# Patient Record
Sex: FEMALE | Race: BLACK OR AFRICAN AMERICAN | Employment: UNEMPLOYED | URBAN - METROPOLITAN AREA
[De-identification: names, ages, dates, MRNs, and addresses within clinical notes are randomized per-mention and may not be internally consistent; named-entity substitution may affect disease eponyms.]

---

## 2018-10-13 ENCOUNTER — APPOINTMENT (OUTPATIENT)
Dept: CT IMAGING | Age: 68
DRG: 683 | End: 2018-10-13
Attending: NURSE PRACTITIONER
Payer: MEDICARE

## 2018-10-13 ENCOUNTER — APPOINTMENT (OUTPATIENT)
Dept: GENERAL RADIOLOGY | Age: 68
DRG: 683 | End: 2018-10-13
Attending: NURSE PRACTITIONER
Payer: MEDICARE

## 2018-10-13 ENCOUNTER — HOSPITAL ENCOUNTER (INPATIENT)
Age: 68
LOS: 2 days | Discharge: HOME HEALTH | DRG: 683 | End: 2018-10-16
Attending: EMERGENCY MEDICINE | Admitting: HOSPITALIST
Payer: MEDICARE

## 2018-10-13 DIAGNOSIS — E87.6 HYPOKALEMIA: ICD-10-CM

## 2018-10-13 DIAGNOSIS — R03.0 ELEVATED BLOOD PRESSURE READING: ICD-10-CM

## 2018-10-13 DIAGNOSIS — N18.9 CHRONIC RENAL FAILURE, UNSPECIFIED CKD STAGE: Primary | ICD-10-CM

## 2018-10-13 DIAGNOSIS — E87.8 HYPOCHLOREMIA: ICD-10-CM

## 2018-10-13 DIAGNOSIS — R42 DIZZINESS: ICD-10-CM

## 2018-10-13 DIAGNOSIS — E87.1 HYPONATREMIA: ICD-10-CM

## 2018-10-13 LAB
AMORPH CRY URNS QL MICRO: ABNORMAL
APPEARANCE UR: CLEAR
BACTERIA URNS QL MICRO: NEGATIVE /HPF
BASOPHILS # BLD: 0 K/UL (ref 0–0.1)
BASOPHILS NFR BLD: 0 % (ref 0–2)
BILIRUB UR QL: NEGATIVE
COLOR UR: YELLOW
DIFFERENTIAL METHOD BLD: ABNORMAL
EOSINOPHIL # BLD: 0 K/UL (ref 0–0.4)
EOSINOPHIL NFR BLD: 0 % (ref 0–5)
EPITH CASTS URNS QL MICRO: ABNORMAL /LPF (ref 0–5)
ERYTHROCYTE [DISTWIDTH] IN BLOOD BY AUTOMATED COUNT: 14.5 % (ref 11.6–14.5)
GLUCOSE UR STRIP.AUTO-MCNC: NEGATIVE MG/DL
HCT VFR BLD AUTO: 39 % (ref 35–45)
HGB BLD-MCNC: 13.2 G/DL (ref 12–16)
HGB UR QL STRIP: ABNORMAL
KETONES UR QL STRIP.AUTO: NEGATIVE MG/DL
LEUKOCYTE ESTERASE UR QL STRIP.AUTO: NEGATIVE
LYMPHOCYTES # BLD: 1.1 K/UL (ref 0.9–3.6)
LYMPHOCYTES NFR BLD: 25 % (ref 21–52)
MCH RBC QN AUTO: 27.7 PG (ref 24–34)
MCHC RBC AUTO-ENTMCNC: 33.8 G/DL (ref 31–37)
MCV RBC AUTO: 81.8 FL (ref 74–97)
MONOCYTES # BLD: 0.3 K/UL (ref 0.05–1.2)
MONOCYTES NFR BLD: 6 % (ref 3–10)
NEUTS SEG # BLD: 3.2 K/UL (ref 1.8–8)
NEUTS SEG NFR BLD: 69 % (ref 40–73)
NITRITE UR QL STRIP.AUTO: NEGATIVE
PH UR STRIP: 5 [PH] (ref 5–8)
PLATELET # BLD AUTO: 124 K/UL (ref 135–420)
PMV BLD AUTO: 11.2 FL (ref 9.2–11.8)
PROT UR STRIP-MCNC: NEGATIVE MG/DL
RBC # BLD AUTO: 4.77 M/UL (ref 4.2–5.3)
RBC #/AREA URNS HPF: ABNORMAL /HPF (ref 0–5)
SP GR UR REFRACTOMETRY: 1.01 (ref 1–1.03)
UROBILINOGEN UR QL STRIP.AUTO: 0.2 EU/DL (ref 0.2–1)
WBC # BLD AUTO: 4.6 K/UL (ref 4.6–13.2)
WBC URNS QL MICRO: ABNORMAL /HPF (ref 0–4)

## 2018-10-13 PROCEDURE — 81001 URINALYSIS AUTO W/SCOPE: CPT | Performed by: NURSE PRACTITIONER

## 2018-10-13 PROCEDURE — 82550 ASSAY OF CK (CPK): CPT | Performed by: EMERGENCY MEDICINE

## 2018-10-13 PROCEDURE — 80076 HEPATIC FUNCTION PANEL: CPT | Performed by: EMERGENCY MEDICINE

## 2018-10-13 PROCEDURE — 99285 EMERGENCY DEPT VISIT HI MDM: CPT

## 2018-10-13 PROCEDURE — 70450 CT HEAD/BRAIN W/O DYE: CPT

## 2018-10-13 PROCEDURE — 80048 BASIC METABOLIC PNL TOTAL CA: CPT | Performed by: EMERGENCY MEDICINE

## 2018-10-13 PROCEDURE — 85025 COMPLETE CBC W/AUTO DIFF WBC: CPT | Performed by: NURSE PRACTITIONER

## 2018-10-13 PROCEDURE — 71045 X-RAY EXAM CHEST 1 VIEW: CPT

## 2018-10-13 PROCEDURE — 93005 ELECTROCARDIOGRAM TRACING: CPT

## 2018-10-13 RX ORDER — MECLIZINE HCL 12.5 MG 12.5 MG/1
25 TABLET ORAL
Status: COMPLETED | OUTPATIENT
Start: 2018-10-13 | End: 2018-10-14

## 2018-10-13 NOTE — IP AVS SNAPSHOT
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 
[phone] Discharge Orders None A check maxime indicates which time of day the medication should be taken. My Medications CONTINUE taking these medications Instructions Each Dose to Equal  
 Morning Noon Evening Bedtime AGGRENOX  mg per SR capsule Generic drug:  aspirin-dipyridamole Your last dose was: Your next dose is: Take 1 Cap by mouth two (2) times a day. 1 Cap GLUCOTROL 10 mg tablet Generic drug:  glipiZIDE Your last dose was: Your next dose is: Take 10 mg by mouth daily. 10 mg  
    
   
   
   
  
 ibuprofen 800 mg tablet Commonly known as:  MOTRIN Your last dose was: Your next dose is: Take 1 Tab by mouth every eight (8) hours as needed for Pain (take with food). 800 mg  
    
   
   
   
  
 oxyCODONE-acetaminophen 5-325 mg per tablet Commonly known as:  PERCOCET Your last dose was: Your next dose is: Take 1 tablet every 4-6 hours as needed for pain control. If you were instructed to try over the counter ibuprofen or tylenol, only take the percocet for pain not controlled with the over the counter medication. simvastatin 20 mg tablet Commonly known as:  ZOCOR Your last dose was: Your next dose is: Take 20 mg by mouth nightly. 20 mg  
    
   
   
   
  
  
STOP taking these medications DIOVAN 80 mg tablet Generic drug:  valsartan Opioid Education Prescription Opioids: What You Need to Know: 
 
 
After general anesthesia or intravenous sedation, for 24 hours or while taking prescription Narcotics: · Limit your activities · Do not drive and operate hazardous machinery · Do not make important personal or business decisions · Do  not drink alcoholic beverages · If you have not urinated within 8 hours after discharge, please contact your surgeon on call. Report the following to your surgeon: 
· Excessive pain, swelling, redness or odor of or around the surgical area · Temperature over 100.5 · Nausea and vomiting lasting longer than 4 hours or if unable to take medications · Any signs of decreased circulation or nerve impairment to extremity: change in color, persistent  numbness, tingling, coldness or increase pain · Any questions What to do at Home: 
Recommended activity: Activity as tolerated and no driving for today. If you experience any of the symptoms above, please follow up with your primary care physician. *  Please give a list of your current medications to your Primary Care Provider.  
 
*  Please update this list whenever your medications are discontinued, doses are 
 changed, or new medications (including over-the-counter products) are added. *  Please carry medication information at all times in case of emergency situations. These are general instructions for a healthy lifestyle: No smoking/ No tobacco products/ Avoid exposure to second hand smoke Surgeon General's Warning:  Quitting smoking now greatly reduces serious risk to your health. Obesity, smoking, and sedentary lifestyle greatly increases your risk for illness A healthy diet, regular physical exercise & weight monitoring are important for maintaining a healthy lifestyle You may be retaining fluid if you have a history of heart failure or if you experience any of the following symptoms:  Weight gain of 3 pounds or more overnight or 5 pounds in a week, increased swelling in our hands or feet or shortness of breath while lying flat in bed. Please call your doctor as soon as you notice any of these symptoms; do not wait until your next office visit. Recognize signs and symptoms of STROKE: 
 
F-face looks uneven A-arms unable to move or move unevenly S-speech slurred or non-existent T-time-call 911 as soon as signs and symptoms begin-DO NOT go Back to bed or wait to see if you get better-TIME IS BRAIN. Warning Signs of HEART ATTACK Call 911 if you have these symptoms: 
? Chest discomfort. Most heart attacks involve discomfort in the center of the chest that lasts more than a few minutes, or that goes away and comes back. It can feel like uncomfortable pressure, squeezing, fullness, or pain. ? Discomfort in other areas of the upper body. Symptoms can include pain or discomfort in one or both arms, the back, neck, jaw, or stomach. ? Shortness of breath with or without chest discomfort. ? Other signs may include breaking out in a cold sweat, nausea, or lightheadedness. Don't wait more than five minutes to call [address]!  Fast action can save your life. Calling 911 is almost always the fastest way to get lifesaving treatment. Emergency Medical Services staff can begin treatment when they arrive  up to an hour sooner than if someone gets to the hospital by car. The discharge information has been reviewed with the patient. The patient verbalized understanding. Discharge medications reviewed with the patient and appropriate educational materials and side effects teaching were provided. Patient armband removed and shredded. ___________________________________________________________________________________________________________________________________MyChart Activation Thank you for requesting access to Tripeese. Please follow the instructions below to securely access and download your online medical record. Tripeese allows you to send messages to your doctor, view your test results, renew your prescriptions, schedule appointments, and more. How Do I Sign Up? 1. In your internet browser, go to [email or web address] 
2. Click on the First Time User? Click Here link in the Sign In box. You will be redirect to the New Member Sign Up page. 3. Enter your Tripeese Access Code exactly as it appears below. You will not need to use this code after youve completed the sign-up process. If you do not sign up before the expiration date, you must request a new code. Tripeese Access Code: [email or web address] Expires: 2019  8:57 PM (This is the date your Tripeese access code will ) 4. Enter the last four digits of your Social Security Number (xxxx) and Date of Birth (mm/dd/yyyy) as indicated and click Submit. You will be taken to the next sign-up page. 5. Create a Tripeese ID. This will be your Tripeese login ID and cannot be changed, so think of one that is secure and easy to remember. 6. Create a Tripeese password. You can change your password at any time. 7. Enter your Password Reset Question and Answer. This can be used at a later time if you forget your password. 8. Enter your e-mail address. You will receive e-mail notification when new information is available in [address] 9. Click Sign Up. You can now view and download portions of your medical record. 10. Click the Download Summary menu link to download a portable copy of your medical information. Additional Information If you have questions, please visit the Frequently Asked Questions section of the Wikirin website at [email or web address] Remember, [employer]irin is NOT to be used for urgent needs. For medical emergencies, dial 911. Wikirin Announcement We are excited to announce that we are making your provider's discharge notes available to you in Wikirin. You will see these notes when they are completed and signed by the physician that discharged you from your recent hospital stay. If you have any questions or concerns about any information you see in Wikirin, please call the Health Information Department where you were seen or reach out to your Primary Care Provider for more information about your plan of care. Introducing [facility]! [employer] introduces Wikirin patient portal. Now you can access parts of your medical record, email your doctor's office, and request medication refills online. 1. In your internet browser, go to [email or web address] 2. Click on the First Time User? Click Here link in the Sign In box. You will see the New Member Sign Up page. 3. Enter your Wikirin Access Code exactly as it appears below. You will not need to use this code after youve completed the sign-up process. If you do not sign up before the expiration date, you must request a new code. · Wikirin Access Code: [email or web address] Expires: 1/11/2019  8:57 PM 
 
4.  Enter the last four digits of your Social Security Number (xxxx) and Date of Birth (mm/dd/yyyy) as indicated and click Submit. You will be taken to the next sign-up page. 5. Create a YieldBuildt ID. This will be your AIRSIS login ID and cannot be changed, so think of one that is secure and easy to remember. 6. Create a YieldBuildt password. You can change your password at any time. 7. Enter your Password Reset Question and Answer. This can be used at a later time if you forget your password. 8. Enter your e-mail address. You will receive e-mail notification when new information is available in [address] 9. Click Sign Up. You can now view and download portions of your medical record. 10. Click the Download Summary menu link to download a portable copy of your medical information. If you have questions, please visit the Frequently Asked Questions section of the AIRSIS website. Remember, AIRSIS is NOT to be used for urgent needs. For medical emergencies, dial 911. Now available from your iPhone and Android! Introducing Rusty Katz As a [employer] patient, I wanted to make you aware of our electronic visit tool called Rusty Garrettfin. [address] 24/7 allows you to connect within minutes with a medical provider 24 hours a day, seven days a week via a mobile device or tablet or logging into a secure website from your computer. You can access Rusty Katz from anywhere in the [place]. A virtual visit might be right for you when you have a simple condition and feel like you just dont want to get out of bed, or cant get away from work for an appointment, when your regular [employer] provider is not available (evenings, weekends or holidays), or when youre out of town and need minor care. Electronic visits cost only $49 and if the [employer] 24/7 provider determines a prescription is needed to treat your condition, one can be electronically transmitted to a nearby pharmacy*. Please take a moment to enroll today if you have not already done so. The enrollment process is free and takes just a few minutes. To enroll, please download the [employer] 24/7 gita to your tablet or phone, or visit [email or web address] to enroll on your computer. And, as an [address] [place] [address] patient with a Fotofeedback account, the results of your visits will be scanned into your electronic medical record and your primary care provider will be able to view the scanned results. We urge you to continue to see your regular [employer] provider for your ongoing medical care. And while your primary care provider may not be the one available when you seek a [employer] virtual visit, the peace of mind you get from getting a real diagnosis real time can be priceless. For more information on [employer], view our Frequently Asked Questions (FAQs) at [email or web address]. Sincerely, 
 
Lisette Live MD 
Chief Medical Officer [facility] Kim Kwadwo *:  certain medications cannot be prescribed via [employer] Providers Seen During Your Hospitalization Provider Specialty Primary office phone Padilla Bettencourt MD Emergency Medicine [phone] Larisa Wells MD Family Practice [phone] Simi Vargas MD Internal Medicine [phone] Yanique Winters DO Internal Medicine [phone] Your Primary Care Physician (PCP) Primary Care Physician Office Phone Office Fax Tomásjoseph Gmaingdanny [phone] You are allergic to the following No active allergies Recent Documentation Height Weight BMI Smoking Status 1.651 m 86.2 kg 31.62 kg/m2 Former Smoker Emergency Contacts Name Discharge Info Relation Home Work Mobile Ambar Rosenberg  Child [2] [phone] [facility] [facility] CAREGIVER [3] Daughter [21] [phone] Patient Belongings The following personal items are in your possession at time of discharge: 
  Dental Appliances: None  Visual Aid: None      Home Medications: None   Jewelry: None  Clothing: Sent home    Other Valuables: Yanni Flavors home Discharge Instructions Attachments/References RENAL FAILURE: ACUTE (ENGLISH) HYPONATREMIA (ENGLISH) ASPIRIN/DIPYRIDAMOLE (BY MOUTH) (ENGLISH) OXYCODONE/ACETAMINOPHEN (BY MOUTH) (ENGLISH) SIMVASTATIN (BY MOUTH) (ENGLISH) GLIPIZIDE (BY MOUTH) (ENGLISH) IBUPROFEN (BY MOUTH) (ENGLISH) Patient Handouts Acute Kidney Injury: Care Instructions Your Care Instructions Acute kidney injury (RAYRAY) is a sudden decrease in kidney function. This can happen over a period of hours, days or, in some cases, weeks. RAYRAY used to be called acute renal failure, but kidney failure doesn't always happen with RAYRAY. Common causes of RAYRAY are dehydration, blood loss, and medicines. When RAYRAY happens, the kidneys have trouble removing waste and excess fluids from the body. The waste and fluids build up and become harmful. Kidney function may return to normal if the cause of RAYRAY is treated quickly. Your chance of a full recovery depends on what caused the problem, how quickly the cause was treated, and what other medical problems you have. A machine may be used to help your kidneys remove waste and fluids for a short period of time. This is called dialysis. Follow-up care is a key part of your treatment and safety. Be sure to make and go to all appointments, and call your doctor if you are having problems. It's also a good idea to know your test results and keep a list of the medicines you take. How can you care for yourself at home? · Talk to your doctor about how much fluid you should drink. · Eat a balanced diet. Talk to your doctor or a dietitian about what type of diet may be best for you. You may need to limit sodium, potassium, and phosphorus. · If you need dialysis, follow the instructions and schedule for dialysis that your doctor gives you. · Do not smoke. Smoking can make your condition worse. If you need help quitting, talk to your doctor about stop-smoking programs and medicines. These can increase your chances of quitting for good. · Do not drink alcohol. · Review all of your medicines with your doctor. Do not take any medicines, including nonsteroidal anti-inflammatory drugs (NSAIDs) such as ibuprofen (Advil, Motrin) or naproxen (Aleve), unless your doctor says it is safe for you to do so. · Make sure that anyone treating you for any health problem knows that you have had RAYRAY. When should you call for help? Call 911 anytime you think you may need emergency care. For example, call if: 
  · You passed out (lost consciousness).  
 Call your doctor now or seek immediate medical care if: 
  · You have new or worse nausea and vomiting.  
  · You have much less urine than normal, or you have no urine.  
  · You are feeling confused or cannot think clearly.  
  · You have new or more blood in your urine.  
  · You have new swelling.  
  · You are dizzy or lightheaded, or feel like you may faint.  
 Watch closely for changes in your health, and be sure to contact your doctor if: 
  · You do not get better as expected. Where can you learn more? Go to http://jose antonio-oanh.info/. Enter N759 in the search box to learn more about \"Acute Kidney Injury: Care Instructions. \" Current as of: March 15, 2018 Content Version: 11.8 © 6826-8182 Healthwise, Incorporated. Care instructions adapted under license by [employer] (which disclaims liability or warranty for this information). If you have questions about a medical condition or this instruction, always ask your healthcare professional. [address] any warranty or liability for your use of this information. Hyponatremia: Care Instructions Your Care Instructions Hyponatremia (say \"db-tm-nvg-TREE-edinson-uh\") means that you don't have enough sodium in your blood. It can cause nausea, vomiting, and headaches. Or you may not feel hungry. In serious cases, it can cause seizures, a coma, or even death. Hyponatremia is not a disease. It is a problem caused by something else, such as medicines or exercising for a long time in hot weather. You can get hyponatremia if you lose a lot of fluids and then you drink a lot of water or other liquids that don't have much sodium. You can also get it if you have kidney, liver, heart, or other health problems. Treatment is focused on getting your sodium levels back to normal. 
Follow-up care is a key part of your treatment and safety. Be sure to make and go to all appointments, and call your doctor if you are having problems. It's also a good idea to know your test results and keep a list of the medicines you take. How can you care for yourself at home? · If your doctor recommends it, drink fluids that have sodium. Sports drinks are a good choice. Or you can eat salty foods. · If your doctor recommends it, limit the amount of water you drink. And limit fluids that are mostly water. These include tea, coffee, and juice. · Take your medicines exactly as prescribed. Call your doctor if you have any problems with your medicine. · Get your sodium levels tested when your doctor tells you to. When should you call for help? Call 911 anytime you think you may need emergency care.  For example, call if: 
  · You have a seizure.  
  · You passed out (lost consciousness).  
 Call your doctor now or seek immediate medical care if: 
  · You are confused or it is hard to focus.  
  · You have little or no appetite.  
  · You feel sick to your stomach or you vomit.  
  · You have a headache.  
  · You have mood changes.  
  · You feel more tired than usual.  
  Watch closely for changes in your health, and be sure to contact your doctor if: 
  · You do not get better as expected. Where can you learn more? Go to http://jose antonio-oanh.info/. Enter S227 in the search box to learn more about \"Hyponatremia: Care Instructions. \" Current as of: June 26, 2018 Content Version: 11.8 © 5983-1657 [employer]. Care instructions adapted under license by [employer] (which disclaims liability or warranty for this information). If you have questions about a medical condition or this instruction, always ask your healthcare professional. [place] [address] any warranty or liability for your use of this information. Aspirin/Dipyridamole (By mouth) Aspirin (AS-pir-in), Dipyridamole (dye-pir-ID-a-mole) Prevents strokes in patients who have blood flow disorders or a history of blood clots. This medicine contains 2 antiplatelets. Brand Name(s): Aggrenox There may be other brand names for this medicine. When This Medicine Should Not Be Used: This medicine is not right for everyone. Do not use it if you had an allergic reaction to aspirin, dipyridamole, or to NSAID pain or arthritis medicine, such as diclofenac, ibuprofen, or naproxen. Do not use this medicine if you are pregnant, or if you have a reaction to aspirin that includes asthma with a runny nose and nasal polyps (swollen tissue growths). How to Use This Medicine:  
Capsule, Long Acting Capsule · Your doctor will tell you how much medicine to use. Do not use more than directed. · Swallow the capsules whole. Do not crush, break, or chew them. · Read and follow the patient instructions that come with this medicine. Talk to your doctor or pharmacist if you have any questions. · Missed dose: Take a dose as soon as you remember. If it is almost time for your next dose, wait until then and take a regular dose.  Do not take extra medicine to make up for a missed dose. · Store the medicine in a closed container at room temperature, away from heat, moisture, and direct light. Drugs and Foods to Avoid: Ask your doctor or pharmacist before using any other medicine, including over-the-counter medicines, vitamins, and herbal products. · Many other drugs can interact with aspirin and dipyridamole combination medicine. Tell your doctor about all other medicines you use, especially the following: ¨ A blood thinner, such as heparin or warfarin ¨ Acetazolamide ¨ Adenosine ¨ A diuretic (water pill) ¨ Antiplatelet medicine ¨ Aspirin ¨ Blood pressure medicine ¨ Diabetes medicine ¨ NSAID pain or arthritis medicine, such as diclofenac, ibuprofen, naproxen ¨ Methotrexate ¨ Probenecid, sulfinpyrazone ¨ Seizure medicine · Aspirin can cause stomach bleeding, and alcohol can make the bleeding worse. Tell your doctor if you regularly have 3 or more drinks of alcohol every day. Warnings While Using This Medicine: · It is not safe to take this medicine during pregnancy. It could harm an unborn baby. Tell your doctor right away if you become pregnant. · Tell your doctor if you are breastfeeding, or if you have kidney disease, liver disease, chest pain, heart problems, low blood pressure, myasthenia gravis (severe muscle weakness), or a history of stomach ulcer. · This medicine increases the risk of bleeding. Make sure your doctor knows if you already have bleeding problems. · Do not give aspirin to children or teenagers who have a viral infection, unless directed by a doctor. Aspirin can cause a serious disease called Reye syndrome. · Tell your doctor right away if you have a severe headache after you take this medicine. Your doctor may need to change your dose. · Your doctor will do lab tests at regular visits to check on the effects of this medicine. Keep all appointments. · Keep all medicine out of the reach of children. Never share your medicine with anyone. Possible Side Effects While Using This Medicine:  
Call your doctor right away if you notice any of these side effects: · Allergic reaction: Itching or hives, swelling in your face or hands, swelling or tingling in your mouth or throat, chest tightness, trouble breathing · Blistering, peeling, red skin rash · Dark urine or pale stools, nausea, vomiting, loss of appetite, stomach pain, yellow skin or eyes · Red or black, tarry stools · Severe headache, sleepiness, confusion · Severe stomach pain, nausea, heartburn · Sweating, dizziness, fast heartbeat, ringing in your ears, and feeling warm · Vomiting blood or material that looks like coffee grounds If you notice these less serious side effects, talk with your doctor: · Joint or muscle pain · Mild stomach pain, nausea, diarrhea, heartburn, indigestion, upset stomach If you notice other side effects that you think are caused by this medicine, tell your doctor. Call your doctor for medical advice about side effects. You may report side effects to FDA at 5-091-FDA-0559 © 2017 [facility] Information is for End User's use only and may not be sold, redistributed or otherwise used for commercial purposes. The above information is an  only. It is not intended as medical advice for individual conditions or treatments. Talk to your doctor, nurse or pharmacist before following any medical regimen to see if it is safe and effective for you. Oxycodone/Acetaminophen (By mouth) Acetaminophen (k-olwq-a-MIN-oh-fen), Oxycodone Hydrochloride (rz-p-IBB-done verito-droe-KLOR-ramakrishna) Treats moderate to moderately severe pain. This medicine is a narcotic pain reliever. Brand Name(s): Endocet, Percocet, Primlev, Xartemis XR There may be other brand names for this medicine. When This Medicine Should Not Be Used: This medicine is not right for everyone. Do not use it if you had an allergic reaction to acetaminophen or oxycodone, or if you have serious breathing problems or paralytic ileus. How to Use This Medicine:  
Capsule, Liquid, Tablet, Long Acting Tablet · Your doctor will tell you how much medicine to use. Do not use more than directed. · An overdose can be dangerous. Follow directions carefully so you do not get too much medicine at one time. · Oral liquid: Measure the oral liquid medicine with a marked measuring spoon, oral syringe, or medicine cup. · Swallow the extended-release tablet whole. Do not crush, break, or chew it. Do not lick or wet the tablet before placing it in your mouth. Do not give this medicine through a feeding tube. · This medicine should come with a Medication Guide. Ask your pharmacist for a copy if you do not have one. · Missed dose: If you miss a dose of this medicine, skip the missed dose and go back to your regular dosing schedule. Do not double doses. · Store the medicine in a closed container at room temperature, away from heat, moisture, and direct light. Ask your pharmacist about the best way to dispose of medicine you do not use. Drugs and Foods to Avoid: Ask your doctor or pharmacist before using any other medicine, including over-the-counter medicines, vitamins, and herbal products. · Do not use Xartemis XR if you are using or have used an MAO inhibitor in the past 14 days. · Some medicines can affect how this medicine works. Tell your doctor if you are using any of the following: ¨ Carbamazepine, erythromycin, ketoconazole, lamotrigine, mirtazapine, naltrexone, phenytoin, propranolol, rifampin, ritonavir, tramadol, trazodone, or zidovudine ¨ Birth control pills ¨ Diuretic (water pill) ¨ Medicine to treat depression ¨ Phenothiazine medicine ¨ Triptan medicine to treat migraine headaches · Do not drink alcohol while you are using this medicine.  Acetaminophen can damage your liver, and alcohol can increase this risk. Do not take acetaminophen without asking your doctor if you have 3 or more drinks of alcohol every day. · Tell your doctor if you use anything else that makes you sleepy. Some examples are allergy medicine, narcotic pain medicine, and alcohol. Tell your doctor if you are using buprenorphine, butorphanol, nalbuphine, pentazocine, a benzodiazepine, or a muscle relaxer. Warnings While Using This Medicine: · Tell your doctor if you are pregnant or breastfeeding, or if you have kidney disease, liver disease, heart disease, low blood pressure, breathing problems or lung disease (such as asthma, COPD), thyroid problems, Yaw disease, pancreas or gallbladder problems, prostate problems, trouble urinating, or a stomach problems, or a history of head injury or brain damage, seizures, or alcohol or drug abuse. Tell your doctor if you are allergic to codeine. · This medicine may cause the following problems: 
¨ High risk of overdose, which can lead to death ¨ Respiratory depression (serious breathing problem that can be life-threatening) ¨ Liver problems ¨ Serious skin reactions ¨ Serotonin syndrome (when used with certain medicines) · This medicine may make you dizzy or drowsy. Do not drive or do anything that could be dangerous until you know how this medicine affects you. Sit or lie down if you feel dizzy. Stand up carefully. · This medicine contains acetaminophen. Read the labels of all other medicines you are using to see if they also contain acetaminophen, or ask your doctor or pharmacist. Gagandeep Ventura not use more than 4 grams (4,000 milligrams) total of acetaminophen in one day. · This medicine can be habit-forming. Do not use more than your prescribed dose. Call your doctor if you think your medicine is not working. · Do not stop using this medicine suddenly. Your doctor will need to slowly decrease your dose before you stop it completely. · This medicine could cause infertility. Talk with your doctor before using this medicine if you plan to have children. · This medicine may cause constipation, especially with long-term use. Ask your doctor if you should use a laxative to prevent and treat constipation. · Keep all medicine out of the reach of children. Never share your medicine with anyone. Possible Side Effects While Using This Medicine:  
Call your doctor right away if you notice any of these side effects: · Allergic reaction: Itching or hives, swelling in your face or hands, swelling or tingling in your mouth or throat, chest tightness, trouble breathing · Anxiety, restlessness, fast heartbeat, fever, muscle spasms, twitching, diarrhea, seeing or hearing things that are not there · Blistering, peeling, red skin rash · Blue lips, fingernails, or skin · Dark urine or pale stools, loss of appetite, stomach pain, yellow skin or eyes · Extreme weakness, shallow breathing, uneven heartbeat, seizures, sweating, or cold or clammy skin · Severe confusion, lightheadedness, dizziness, or fainting · Severe constipation, nausea, or vomiting · Trouble breathing or slow breathing If you notice these less serious side effects, talk with your doctor:  
· Headache · Mild constipation, nausea, or vomiting · Mild sleepiness or drowsiness If you notice other side effects that you think are caused by this medicine, tell your doctor. Call your doctor for medical advice about side effects. You may report side effects to FDA at 9-917-FDA-8520 © 2017 [facility] [place] Information is for End User's use only and may not be sold, redistributed or otherwise used for commercial purposes. The above information is an  only. It is not intended as medical advice for individual conditions or treatments. Talk to your doctor, nurse or pharmacist before following any medical regimen to see if it is safe and effective for you. Simvastatin (By mouth) Simvastatin ([facility]-va-STAT-in) Treats high cholesterol and triglyceride levels. May reduce the risk of heart attack, stroke, and related health conditions. This medicine is a statin. Brand Name(s): FloLipid, Zocor There may be other brand names for this medicine. When This Medicine Should Not Be Used: This medicine is not right for everyone. Do not use it if you had an allergic reaction to simvastatin, you are pregnant or breastfeeding. How to Use This Medicine:  
Liquid, Tablet · Take your medicine as directed. Your dose may need to be changed several times to find what works best for you. · Take the medicine in the evening, unless your doctor tells you otherwise. · Oral liquid: ¨ Take this medicine on the evening, on an empty stomach. ¨ Shake the oral suspension well for at least 20 seconds before using it. ¨ Measure the oral liquid medicine with a marked measuring spoon, oral syringe, or medicine cup. · Missed dose: Take a dose as soon as you remember. If it is almost time for your next dose, wait until then and take a regular dose. Do not take extra medicine to make up for a missed dose. · Store the medicine in a closed container at room temperature, away from heat, moisture, and direct light. Drugs and Foods to Avoid: Ask your doctor or pharmacist before using any other medicine, including over-the-counter medicines, vitamins, and herbal products. · Do not use this medicine together with boceprevir, clarithromycin, cobicistat, cyclosporine, danazol, erythromycin, gemfibrozil, itraconazole, ketoconazole, nefazodone, posaconazole, telaprevir, telithromycin, voriconazole, or medicines to treat HIV/AIDS. · Some foods and medicines can affect how simvastatin works. Tell your doctor if you are using any of the following: ¨ Amiodarone, colchicine, digoxin, dronedarone, lomitapide, niacin (vitamin B3), ranolazine ¨ Blood pressure medicine (including amlodipine, diltiazem, verapamil) ¨ Blood thinner (including warfarin) · Do not eat grapefruit or drink grapefruit juice while you are using this medicine. Warnings While Using This Medicine: · It is not safe to take this medicine during pregnancy. It could harm an unborn baby. Tell your doctor right away if you become pregnant. · Tell your doctor if you have kidney disease, liver disease, diabetes, thyroid disease, or a history of stroke or heart disease. Tell your doctor if you drink alcohol regularly or have a history of liver disease. · This medicine may cause liver problems, or myopathy or rhabdomyolysis (muscle damage). · Tell any doctor or dentist who treats you that you are using this medicine. You may need to stop using this medicine several days before you have surgery or medical tests. · Your doctor will do lab tests at regular visits to check on the effects of this medicine. Keep all appointments. · Keep all medicine out of the reach of children. Never share your medicine with anyone. Possible Side Effects While Using This Medicine:  
Call your doctor right away if you notice any of these side effects: · Allergic reaction: Itching or hives, swelling in your face or hands, swelling or tingling in your mouth or throat, chest tightness, trouble breathing · Blistering, peeling, red skin rash · Dark urine or pale stools, nausea, vomiting, loss of appetite, stomach pain, yellow skin or eyes · Fast or uneven heartbeat · Muscle pain, tenderness, or weakness · Unusual tiredness If you notice other side effects that you think are caused by this medicine, tell your doctor. Call your doctor for medical advice about side effects. You may report side effects to FDA at 6-149-FDA-7691 © 2017 [facility] Information is for End User's use only and may not be sold, redistributed or otherwise used for commercial purposes. The above information is an  only. It is not intended as medical advice for individual conditions or treatments. Talk to your doctor, nurse or pharmacist before following any medical regimen to see if it is safe and effective for you. Glipizide (By mouth) Glipizide (GLIP-i-zide) Treats type 2 diabetes. Brand Name(s): Glucotrol, Glucotrol XL There may be other brand names for this medicine. When This Medicine Should Not Be Used: This medicine is not right for everyone. Do not use it if you had an allergic reaction to glipizide. How to Use This Medicine:  
Tablet, Long Acting Tablet · Take your medicine as directed. Your dose may need to be changed several times to find what works best for you. · Take the regular tablet about 30 minutes before you eat a meal. Take the extended-release tablet with breakfast, unless your doctor tells you differently. · Swallow the extended-release tablet whole. Do not crush, break, or chew it. · Read and follow the patient instructions that come with this medicine. Talk to your doctor or pharmacist if you have any questions. · Missed dose: Take a dose as soon as you remember. If it is almost time for your next dose, wait until then and take a regular dose. Do not take extra medicine to make up for a missed dose. · Store the medicine in a closed container at room temperature, away from heat, moisture, and direct light. Drugs and Foods to Avoid: Ask your doctor or pharmacist before using any other medicine, including over-the-counter medicines, vitamins, and herbal products. · Some medicines can affect how glipizide works. Tell your doctor if you are using any of the following: ¨ Aspirin, chloramphenicol, fluconazole, isoniazid, miconazole, niacin, phenytoin, probenecid ¨ Birth control pill ¨ Beta blocker medicine ¨ Blood thinner (including warfarin) ¨ Diuretic (water pill) ¨ MAO inhibitor ¨ Medicine to treat an infection (including ciprofloxacin, levofloxacin, moxifloxacin) ¨ NSAID pain or arthritis medicine (including celecoxib, diclofenac, ibuprofen, naproxen) ¨ Phenothiazine medicine [employer]al[employer] Steroid medicine ¨ Sulfa drug ¨ Thyroid medicine · If you are also taking colesevelam, take it at least 4 hours after you take glipizide. Warnings While Using This Medicine: · Tell your doctor if you are pregnant or breastfeeding, or if you have kidney disease, liver disease, heart or blood vessel problems, stomach or bowel problems, adrenal or pituitary gland problems, or G6PD deficiency. Tell your doctor if you drink alcohol. · This medicine may cause the following problems: 
¨ Higher risk of heart or blood vessel problems ¨ Low blood sugar · Part of the extended-release tablet may pass in your stool. This is normal. 
· Your doctor will do lab tests at regular visits to check on the effects of this medicine. Keep all appointments. · Keep all medicine out of the reach of children. Never share your medicine with anyone. Possible Side Effects While Using This Medicine:  
Call your doctor right away if you notice any of these side effects: · Allergic reaction: Itching or hives, swelling in your face or hands, swelling or tingling in your mouth or throat, chest tightness, trouble breathing · Blurred vision, changes in vision · Fast or pounding heartbeat, lightheadedness, dizziness · Headache or confusion · Sweating, trembling, shakiness, increased hunger If you notice these less serious side effects, talk with your doctor: · Mild nausea, diarrhea, stomach upset If you notice other side effects that you think are caused by this medicine, tell your doctor. Call your doctor for medical advice about side effects. You may report side effects to FDA at 0-151-FDA-2130 © 2017 [address] Information is for End User's use only and may not be sold, redistributed or otherwise used for commercial purposes. The above information is an  only. It is not intended as medical advice for individual conditions or treatments. Talk to your doctor, nurse or pharmacist before following any medical regimen to see if it is safe and effective for you. Ibuprofen (By mouth) Ibuprofen (eye-bue-PROE-fen) Treats pain and fever. This medicine is an NSAID. Brand Name(s): Advil, Advil Children's, Advil Liqui-Gels, Advil Migraine, All-Purpose First Aid Kit, Children's Ibuprofen, Children's Motrin, Comfort Pac, Concentrated Motrin Infants' Drops, Equate Ibuprofen [place]q Strength, Genpril, [facility]ighbor Ibuprofen Infants', [facility] Children's Ibuprofen, [facility] Ibuprofen, [facility] Ibuprofen [place]q Strength There may be other brand names for this medicine. When This Medicine Should Not Be Used: This medicine is not right for everyone. Do not use if you had an allergic reaction (including asthma) to ibuprofen, aspirin, or another NSAID, or right before or after heart surgery. How to Use This Medicine:  
Capsule, Liquid Filled Capsule, Suspension, Tablet, Chewable Tablet · Your doctor will tell you how much medicine to use. Do not use more than directed. · Prescription ibuprofen should come with a Medication Guide. Ask your pharmacist for the Medication Guide if you do not have one. · Follow the instructions on the medicine label if you are using this medicine without a prescription. · Take this medicine with food or milk if it upsets your stomach. · Oral liquid: Shake well just before using. Measure with a marked measuring spoon, oral syringe, or medicine cup. · Chewable tablet: Chew completely before you swallow it. Then drink some water to make sure you swallow all of the medicine.  
· For Children: Ask your pharmacist if you are not sure how much medicine to give a child. The dose is usually based on weight, not age. Never give more medicine than directed. · For Adults: Do not take more than 6 pills in 1 day (24 hours) unless your doctor tells you to. · Missed dose: If you take this medicine on a regular basis and miss a dose, take it as soon as you can. If it is almost time for your next dose, wait until then to use the medicine and skip the missed dose. Do not use extra medicine to make up for a missed dose. · Store the medicine in a closed container at room temperature, away from heat, moisture, and direct light. Do not freeze the oral liquid. Drugs and Foods to Avoid: Ask your doctor or pharmacist before using any other medicine, including over-the-counter medicines, vitamins, and herbal products. · Some foods and medicine can affect how ibuprofen works. Tell your doctor if you are also using lithium, methotrexate, a blood thinner (such as warfarin), a steroid medicine (such as hydrocortisone, prednisolone, prednisone), a diuretic (water pill), or an ACE inhibitor blood pressure medicine. · Do not use any other NSAID medicine unless your doctor says it is okay. Some other NSAIDs are aspirin, diclofenac, naproxen, or celecoxib. · Do not drink alcohol while you are using this medicine. Warnings While Using This Medicine: · Tell your doctor if you are pregnant or breastfeeding. Do not use this medicine during the later part of pregnancy. · Tell your doctor if you have kidney disease, liver disease, asthma, lupus or a similar connective tissue disease, or a history of ulcers or other digestion problems. Tell your doctor if you smoke or have heart or blood circulation problems, including high blood pressure, heart failure (CHF), or bleeding problems. · This medicine may cause the following problems: ¨ Bleeding and ulcers in the stomach or intestines ¨ Higher risk of heart attack or stroke ¨ Liver damage ¨ Kidney damage ¨ Vision problems · Call your doctor if symptoms get worse, pain lasts more than 10 days, or fever lasts more than 3 days. · This medicine might contain sugar or phenylalanine (aspartame). · Tell any doctor or dentist who treats you that you are using this medicine. · Keep all medicine out of the reach of children. Never share your medicine with anyone. Possible Side Effects While Using This Medicine:  
Call your doctor right away if you notice any of these side effects: · Allergic reaction: Itching or hives, swelling in your face or hands, swelling or tingling in your mouth or throat, chest tightness, trouble breathing · Blistering, peeling, or red skin rash · Change in how much or how often you urinate · Chest pain that may spread to your arms, jaw, back, or neck, trouble breathing, nausea, unusual sweating, faintness · Chest pain, trouble breathing, weakness on one side of your body, severe headache, trouble seeing or talking, pain in your lower leg · Dark urine or pale stools, nausea, vomiting, loss of appetite, stomach pain, yellow skin or eyes · Fever, neck pain, stiff neck · Severe stomach pain, vomiting blood, bloody or black, tarry stools · Swelling in your hands, ankles, or feet, rapid weight gain · Trouble seeing, blind spots, change in how you see colors · Unusual bleeding, bruising, or weakness If you notice these less serious side effects, talk with your doctor: · Constipation, diarrhea, gas, mild upset stomach · Dizziness, headache, ringing in the ears If you notice other side effects that you think are caused by this medicine, tell your doctor. Call your doctor for medical advice about side effects. You may report side effects to FDA at 6-859-FDA-9600 © 2017 [address] Information is for End User's use only and may not be sold, redistributed or otherwise used for commercial purposes. The above information is an  only.  It is not intended as medical advice for individual conditions or treatments. Talk to your doctor, nurse or pharmacist before following any medical regimen to see if it is safe and effective for you. Please provide this summary of care documentation to your next provider. Signatures-by signing, you are acknowledging that this After Visit Summary has been reviewed with you and you have received a copy. Patient Signature:  ____________________________________________________________ Date:  ____________________________________________________________  
  
Shanita Roof Provider Signature:  ____________________________________________________________ Date:  ____________________________________________________________

## 2018-10-14 PROBLEM — N17.9 ACUTE RENAL FAILURE (ARF) (HCC): Status: ACTIVE | Noted: 2018-10-14

## 2018-10-14 PROBLEM — E87.1 HYPONATREMIA: Status: ACTIVE | Noted: 2018-10-14

## 2018-10-14 LAB
ALBUMIN SERPL-MCNC: 4.3 G/DL (ref 3.4–5)
ALBUMIN/GLOB SERPL: 0.8 {RATIO} (ref 0.8–1.7)
ALP SERPL-CCNC: 101 U/L (ref 45–117)
ALT SERPL-CCNC: 24 U/L (ref 13–56)
ANION GAP SERPL CALC-SCNC: 10 MMOL/L (ref 3–18)
ANION GAP SERPL CALC-SCNC: 12 MMOL/L (ref 3–18)
ANION GAP SERPL CALC-SCNC: 13 MMOL/L (ref 3–18)
AST SERPL-CCNC: 28 U/L (ref 15–37)
ATRIAL RATE: 111 BPM
ATRIAL RATE: 227 BPM
BILIRUB DIRECT SERPL-MCNC: 0.3 MG/DL (ref 0–0.2)
BILIRUB SERPL-MCNC: 0.8 MG/DL (ref 0.2–1)
BUN SERPL-MCNC: 108 MG/DL (ref 7–18)
BUN SERPL-MCNC: 113 MG/DL (ref 7–18)
BUN SERPL-MCNC: 114 MG/DL (ref 7–18)
BUN/CREAT SERPL: 33 (ref 12–20)
BUN/CREAT SERPL: 33 (ref 12–20)
BUN/CREAT SERPL: 35 (ref 12–20)
CALCIUM SERPL-MCNC: 8.2 MG/DL (ref 8.5–10.1)
CALCIUM SERPL-MCNC: 8.3 MG/DL (ref 8.5–10.1)
CALCIUM SERPL-MCNC: 8.8 MG/DL (ref 8.5–10.1)
CALCULATED R AXIS, ECG10: 39 DEGREES
CALCULATED R AXIS, ECG10: 45 DEGREES
CALCULATED T AXIS, ECG11: -127 DEGREES
CALCULATED T AXIS, ECG11: -132 DEGREES
CHLORIDE SERPL-SCNC: 71 MMOL/L (ref 100–108)
CHLORIDE SERPL-SCNC: 78 MMOL/L (ref 100–108)
CHLORIDE SERPL-SCNC: 85 MMOL/L (ref 100–108)
CK MB CFR SERPL CALC: 1.3 % (ref 0–4)
CK MB SERPL-MCNC: 4.6 NG/ML (ref 5–25)
CK SERPL-CCNC: 363 U/L (ref 26–192)
CO2 SERPL-SCNC: 32 MMOL/L (ref 21–32)
CO2 SERPL-SCNC: 33 MMOL/L (ref 21–32)
CO2 SERPL-SCNC: 34 MMOL/L (ref 21–32)
CREAT SERPL-MCNC: 3.2 MG/DL (ref 0.6–1.3)
CREAT SERPL-MCNC: 3.31 MG/DL (ref 0.6–1.3)
CREAT SERPL-MCNC: 3.42 MG/DL (ref 0.6–1.3)
DIAGNOSIS, 93000: NORMAL
DIAGNOSIS, 93000: NORMAL
GLOBULIN SER CALC-MCNC: 5.1 G/DL (ref 2–4)
GLUCOSE BLD STRIP.AUTO-MCNC: 136 MG/DL (ref 70–110)
GLUCOSE BLD STRIP.AUTO-MCNC: 160 MG/DL (ref 70–110)
GLUCOSE BLD STRIP.AUTO-MCNC: 198 MG/DL (ref 70–110)
GLUCOSE BLD STRIP.AUTO-MCNC: 227 MG/DL (ref 70–110)
GLUCOSE BLD STRIP.AUTO-MCNC: 270 MG/DL (ref 70–110)
GLUCOSE BLD STRIP.AUTO-MCNC: 306 MG/DL (ref 70–110)
GLUCOSE SERPL-MCNC: 121 MG/DL (ref 74–99)
GLUCOSE SERPL-MCNC: 128 MG/DL (ref 74–99)
GLUCOSE SERPL-MCNC: 156 MG/DL (ref 74–99)
MAGNESIUM SERPL-MCNC: 3 MG/DL (ref 1.6–2.6)
POTASSIUM SERPL-SCNC: 2.7 MMOL/L (ref 3.5–5.5)
POTASSIUM SERPL-SCNC: 3.1 MMOL/L (ref 3.5–5.5)
POTASSIUM SERPL-SCNC: 3.8 MMOL/L (ref 3.5–5.5)
PROT SERPL-MCNC: 9.4 G/DL (ref 6.4–8.2)
Q-T INTERVAL, ECG07: 406 MS
Q-T INTERVAL, ECG07: 406 MS
QRS DURATION, ECG06: 106 MS
QRS DURATION, ECG06: 98 MS
QTC CALCULATION (BEZET), ECG08: 459 MS
QTC CALCULATION (BEZET), ECG08: 465 MS
SODIUM SERPL-SCNC: 118 MMOL/L (ref 136–145)
SODIUM SERPL-SCNC: 122 MMOL/L (ref 136–145)
SODIUM SERPL-SCNC: 128 MMOL/L (ref 136–145)
TROPONIN I SERPL-MCNC: 0.04 NG/ML (ref 0–0.04)
VENTRICULAR RATE, ECG03: 77 BPM
VENTRICULAR RATE, ECG03: 79 BPM

## 2018-10-14 PROCEDURE — 65270000029 HC RM PRIVATE

## 2018-10-14 PROCEDURE — 74011250636 HC RX REV CODE- 250/636: Performed by: NURSE PRACTITIONER

## 2018-10-14 PROCEDURE — 96360 HYDRATION IV INFUSION INIT: CPT

## 2018-10-14 PROCEDURE — 93005 ELECTROCARDIOGRAM TRACING: CPT

## 2018-10-14 PROCEDURE — 80048 BASIC METABOLIC PNL TOTAL CA: CPT | Performed by: HOSPITALIST

## 2018-10-14 PROCEDURE — 74011250637 HC RX REV CODE- 250/637: Performed by: NURSE PRACTITIONER

## 2018-10-14 PROCEDURE — 74011636637 HC RX REV CODE- 636/637: Performed by: HOSPITALIST

## 2018-10-14 PROCEDURE — 74011250637 HC RX REV CODE- 250/637: Performed by: HOSPITALIST

## 2018-10-14 PROCEDURE — 36415 COLL VENOUS BLD VENIPUNCTURE: CPT | Performed by: HOSPITALIST

## 2018-10-14 PROCEDURE — 83735 ASSAY OF MAGNESIUM: CPT | Performed by: HOSPITALIST

## 2018-10-14 PROCEDURE — 74011250636 HC RX REV CODE- 250/636: Performed by: HOSPITALIST

## 2018-10-14 PROCEDURE — 82962 GLUCOSE BLOOD TEST: CPT

## 2018-10-14 RX ORDER — MAGNESIUM SULFATE HEPTAHYDRATE 40 MG/ML
2 INJECTION, SOLUTION INTRAVENOUS ONCE
Status: COMPLETED | OUTPATIENT
Start: 2018-10-14 | End: 2018-10-14

## 2018-10-14 RX ORDER — SODIUM CHLORIDE 9 MG/ML
500 INJECTION, SOLUTION INTRAVENOUS ONCE
Status: COMPLETED | OUTPATIENT
Start: 2018-10-14 | End: 2018-10-14

## 2018-10-14 RX ORDER — SODIUM CHLORIDE 9 MG/ML
100 INJECTION, SOLUTION INTRAVENOUS CONTINUOUS
Status: DISCONTINUED | OUTPATIENT
Start: 2018-10-14 | End: 2018-10-16 | Stop reason: HOSPADM

## 2018-10-14 RX ORDER — DEXTROSE 50 % IN WATER (D50W) INTRAVENOUS SYRINGE
25-50 AS NEEDED
Status: DISCONTINUED | OUTPATIENT
Start: 2018-10-14 | End: 2018-10-16 | Stop reason: HOSPADM

## 2018-10-14 RX ORDER — ACETAMINOPHEN 325 MG/1
650 TABLET ORAL
Status: DISCONTINUED | OUTPATIENT
Start: 2018-10-14 | End: 2018-10-16 | Stop reason: HOSPADM

## 2018-10-14 RX ORDER — MAGNESIUM SULFATE 100 %
16 CRYSTALS MISCELLANEOUS AS NEEDED
Status: DISCONTINUED | OUTPATIENT
Start: 2018-10-14 | End: 2018-10-16 | Stop reason: HOSPADM

## 2018-10-14 RX ORDER — INSULIN LISPRO 100 [IU]/ML
INJECTION, SOLUTION INTRAVENOUS; SUBCUTANEOUS
Status: DISCONTINUED | OUTPATIENT
Start: 2018-10-14 | End: 2018-10-14

## 2018-10-14 RX ORDER — POTASSIUM CHLORIDE 20 MEQ/1
40 TABLET, EXTENDED RELEASE ORAL EVERY 4 HOURS
Status: COMPLETED | OUTPATIENT
Start: 2018-10-14 | End: 2018-10-14

## 2018-10-14 RX ORDER — SIMVASTATIN 20 MG/1
20 TABLET, FILM COATED ORAL DAILY
Status: DISCONTINUED | OUTPATIENT
Start: 2018-10-14 | End: 2018-10-16 | Stop reason: HOSPADM

## 2018-10-14 RX ORDER — OXYCODONE AND ACETAMINOPHEN 5; 325 MG/1; MG/1
1 TABLET ORAL
Status: DISCONTINUED | OUTPATIENT
Start: 2018-10-14 | End: 2018-10-16 | Stop reason: HOSPADM

## 2018-10-14 RX ORDER — ASPIRIN AND DIPYRIDAMOLE 25; 200 MG/1; MG/1
1 CAPSULE, EXTENDED RELEASE ORAL 2 TIMES DAILY
Status: DISCONTINUED | OUTPATIENT
Start: 2018-10-14 | End: 2018-10-16 | Stop reason: HOSPADM

## 2018-10-14 RX ORDER — ACETAMINOPHEN 500 MG
1000 TABLET ORAL
Status: COMPLETED | OUTPATIENT
Start: 2018-10-14 | End: 2018-10-14

## 2018-10-14 RX ORDER — POTASSIUM CHLORIDE 20 MEQ/1
40 TABLET, EXTENDED RELEASE ORAL
Status: COMPLETED | OUTPATIENT
Start: 2018-10-14 | End: 2018-10-14

## 2018-10-14 RX ORDER — INSULIN LISPRO 100 [IU]/ML
INJECTION, SOLUTION INTRAVENOUS; SUBCUTANEOUS
Status: DISCONTINUED | OUTPATIENT
Start: 2018-10-14 | End: 2018-10-16 | Stop reason: HOSPADM

## 2018-10-14 RX ORDER — SIMVASTATIN 20 MG/1
20 TABLET, FILM COATED ORAL
Status: DISCONTINUED | OUTPATIENT
Start: 2018-10-14 | End: 2018-10-14 | Stop reason: SDUPTHER

## 2018-10-14 RX ADMIN — SODIUM CHLORIDE 100 ML/HR: 900 INJECTION, SOLUTION INTRAVENOUS at 21:57

## 2018-10-14 RX ADMIN — OXYCODONE HYDROCHLORIDE AND ACETAMINOPHEN 1 TABLET: 5; 325 TABLET ORAL at 11:22

## 2018-10-14 RX ADMIN — MAGNESIUM SULFATE HEPTAHYDRATE 2 G: 40 INJECTION, SOLUTION INTRAVENOUS at 11:11

## 2018-10-14 RX ADMIN — INSULIN LISPRO 8 UNITS: 100 INJECTION, SOLUTION INTRAVENOUS; SUBCUTANEOUS at 17:14

## 2018-10-14 RX ADMIN — INSULIN LISPRO 3 UNITS: 100 INJECTION, SOLUTION INTRAVENOUS; SUBCUTANEOUS at 21:57

## 2018-10-14 RX ADMIN — ASPIRIN AND DIPYRIDAMOLE 1 CAPSULE: 25; 200 CAPSULE, EXTENDED RELEASE ORAL at 17:13

## 2018-10-14 RX ADMIN — ACETAMINOPHEN 1000 MG: 500 TABLET, FILM COATED ORAL at 02:33

## 2018-10-14 RX ADMIN — SODIUM CHLORIDE 1000 ML: 900 INJECTION, SOLUTION INTRAVENOUS at 00:21

## 2018-10-14 RX ADMIN — INSULIN LISPRO 2 UNITS: 100 INJECTION, SOLUTION INTRAVENOUS; SUBCUTANEOUS at 08:30

## 2018-10-14 RX ADMIN — POTASSIUM CHLORIDE 40 MEQ: 20 TABLET, EXTENDED RELEASE ORAL at 00:41

## 2018-10-14 RX ADMIN — POTASSIUM CHLORIDE 40 MEQ: 20 TABLET, EXTENDED RELEASE ORAL at 11:11

## 2018-10-14 RX ADMIN — MECLIZINE 25 MG: 12.5 TABLET ORAL at 00:21

## 2018-10-14 RX ADMIN — SODIUM CHLORIDE 500 ML/HR: 900 INJECTION, SOLUTION INTRAVENOUS at 20:12

## 2018-10-14 RX ADMIN — SIMVASTATIN 20 MG: 20 TABLET, FILM COATED ORAL at 09:04

## 2018-10-14 RX ADMIN — SODIUM CHLORIDE 100 ML/HR: 900 INJECTION, SOLUTION INTRAVENOUS at 14:34

## 2018-10-14 RX ADMIN — POTASSIUM CHLORIDE 40 MEQ: 20 TABLET, EXTENDED RELEASE ORAL at 14:31

## 2018-10-14 NOTE — H&P
History and Physical 
 
Patient: Abelino Bhatt               Sex: female          DOA: 10/13/2018 YOB: 1950      Age:  78 y.o.        LOS:  LOS: 0 days HPI:  
 
Abelino Bhatt is a 78 y.o. female who presented to the ER with weakness and falling. Patient reports that this has been worsening for about 1 week. She does not have SOB. No seizure or syncope. She sometimes feels the room is spinning. She has no fever. No altered mental status. In the ER she is found to have severe hyponatremia. She reports that she drinks about 4 very large cups of water per day at home. She felt she wasn't drinking enough previously. She will be admitted for ongoing management. Past Medical History:  
Diagnosis Date  Diabetes ([facility] [address]  Hyperlipidemia  Hypertension Social History: 
 Tobacco use:  Patient does not use tobacco 
 Alcohol use:  Patient does not use alcohol Patient lives alone. Son stays with her sometimes Family History: 
 Multiple family members with HTN Review of Systems Constitutional:  Weakness and falling as above, no fever or weight loss HEENT:  No headache or visual changes Cardiovascular:  No chest pain or diaphoresis Respiratory:  No coughing, wheezing, or shortness of breath. GI:  No nausea or vomitting. No diarrhea :  No hematuria or dysuria Skin:  No rashes or moles Neuro:  No seizures or syncope Hematological:  No bruising or bleeding Endocrine:  Patient has known diabetes, no thyroid disease Physical Exam:  
  
Visit Vitals  /68  Pulse 73  Temp 98.3 °F (36.8 °C)  Resp 16  
 Ht 5' 5\" (1.651 m)  Wt 86.2 kg (190 lb)  SpO2 100%  BMI 31.62 kg/m2 Physical Exam: 
 
Gen:  No distress, alert HEENT:  Normal cephalic atraumatic, extra-occular movements are intact. Neck:  Supple, No JVD Lungs:  Clear bilaterally, no wheeze, no rales, normal effort Heart:  Regular Rate and Rhythm, normal S1 and S2, no edema Abdomen:  Soft, non tender, normal bowel sounds, no guarding. Extremities:  Well perfused, no cyanosis or edema Neurological:  Awake and alert, CN's are intact, normal strength throughout extremities Skin:  No rashes or moles Mental Status:  Normal thought process, does not appear anxious Laboratory Studies: 
 
BMP:  
Lab Results Component Value Date/Time  (LL) 10/13/2018 10:45 PM  
 K 2.7 (LL) 10/13/2018 10:45 PM  
 CL 71 (L) 10/13/2018 10:45 PM  
 CO2 34 (H) 10/13/2018 10:45 PM  
 AGAP 13 10/13/2018 10:45 PM  
  (H) 10/13/2018 10:45 PM  
  (H) 10/13/2018 10:45 PM  
 CREA 3.42 (H) 10/13/2018 10:45 PM  
 GFRAA 16 (L) 10/13/2018 10:45 PM  
 GFRNA 13 (L) 10/13/2018 10:45 PM  
 
CBC:  
Lab Results Component Value Date/Time WBC 4.6 10/13/2018 10:55 PM  
 HGB 13.2 10/13/2018 10:55 PM  
 HCT 39.0 10/13/2018 10:55 PM  
  (L) 10/13/2018 10:55 PM  
 
 
Assessment/Plan Principal Problem: Hyponatremia (10/14/2018) Active Problems: 
  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 
 
Will continue with fluid. Follow Na and Creatinine She may require fluid restriction BP control BS control Discussed with patient and daughter at the bedside.

## 2018-10-14 NOTE — PROGRESS NOTES
Very low Na and potassium levels as well as severe RAYRAY. Had 2 L in ed and 40 K. This is not sufficient at the levels seen at midnight. Repeat all labs at 1 pm after adding on mag, giving 2 G mag and another 40 K+ . Sodium level was 118. Need to check q 8 bmps to prevent to quickly recorrecting. Will start slow fluids- im unsure that 2 L IVF was best choice in such significant hyponatremia.

## 2018-10-14 NOTE — PROGRESS NOTES
Problem: Pressure Injury - Risk of 
Goal: *Prevention of pressure injury Document Anton Scale and appropriate interventions in the flowsheet. Outcome: Progressing Towards Goal 
Pressure Injury Interventions: 
Sensory Interventions: Keep linens dry and wrinkle-free Moisture Interventions: Absorbent underpads Activity Interventions: Increase time out of bed Mobility Interventions: HOB 30 degrees or less Nutrition Interventions: Document food/fluid/supplement intake Problem: Falls - Risk of 
Goal: *Absence of Falls Document Junie Garduno Fall Risk and appropriate interventions in the flowsheet. Outcome: Progressing Towards Goal 
Fall Risk Interventions: 
Mobility Interventions: Patient to call before getting OOB Medication Interventions: Patient to call before getting OOB Elimination Interventions: Call light in reach History of Falls Interventions: Consult care management for discharge planning

## 2018-10-14 NOTE — ED NOTES
TRANSFER - ED to INPATIENT REPORT: 
 
SBAR report made available to receiving floor on this patient being transferred to [address] [place] (2200)  for routine progression of care Admitting diagnosis Acute renal failure (ARF) ([address] Hyponatremia Information from the following report(s) SBAR was made available to receiving floor. Lines:  
Peripheral IV 10/13/18 Right Antecubital (Active) Site Assessment Clean, dry, & intact 10/13/2018 11:52 PM  
Phlebitis Assessment 0 10/13/2018 11:52 PM  
Infiltration Assessment 0 10/13/2018 11:52 PM  
Dressing Status Clean, dry, & intact 10/13/2018 11:52 PM  
Dressing Type Transparent 10/13/2018 11:52 PM  
Hub Color/Line Status Patent; Flushed 10/13/2018 11:52 PM  
Action Taken Blood drawn 10/13/2018 11:52 PM  
  
 
Medication list confirmed with patient Opportunity for questions and clarification was provided. Patient is oriented to time, place, person and situation NONE Patient is  continent and ambulatory with assist 
  
Valuables transported with patient Patient transported with: 
 Registered Nurse

## 2018-10-14 NOTE — PROGRESS NOTES
Bedside report given by Saravanan Tinsley RN. Pt in bed with HOB elevated bed locked at lowest position, call bell in reach. Pt is A&OX4 and has purposeful movement in all extremities. Family member present at bedside resting. Pt skin is intact. IV infusing in RA is c/d/i and infusing, no swelling erythema or infiltration noted at this time. Will continue to monitor. 1800 - No change during shift. 1910 - . Bedside and Verbal shift change report given to Saravanan Tinsley RN. (oncoming nurse) by Nahum Baxter (offgoing nurse). Report included the following information SBAR, Kardex and MAR.

## 2018-10-14 NOTE — PROGRESS NOTES
Problem: Discharge Planning Goal: *Discharge to safe environment Outcome: Progressing Towards Goal 
Discharge plan home, likely with home health.

## 2018-10-14 NOTE — PROGRESS NOTES
80: Received patient from ED via stretcher, ambulated to bed. Patient requesting, \"sponge to urinate because I can't get up\". Patient ambulates with assist, encouraged to allow staff to assist with voiding, declines, requesting external cath. 3622: Patient refused flu shot prior to D/C.  
0516: BG checked, 270, patient has history of DM, no order for scheduled ACHS POC BG, patient on regular diet, no sliding scale coverage. Paged Dr. Alberto Atkinson, on call. Patient resting in bed, family at the bedside. 56: Orders entered, see order history, patient educated we will be utilizing insulin and monitoring BG. Bedside shift change report given to ISIDRA Smith (oncoming nurse) by Bobby Castañeda RN (offgoing nurse). Report included the following information SBAR and Kardex.

## 2018-10-14 NOTE — ED NOTES
Patient comes in by medics for complaints of dizziness. Patient lives at home by herself and states that she fell today and fell several days ago. Patient states that she was only on the floor 5-10 minutes and then was able to get herself up.

## 2018-10-14 NOTE — PROGRESS NOTES
Bedside shift change report given to ISIDRA Vu (oncoming nurse) by Lui Harris RN (offgoing nurse). Report included the following information SBAR and Kardex. 2000: BSC to chair, voided, paged Dr. Jossue Maldonado regarding BP.  
2002: Patient changed to insulin resistant sliding scale coverage. 2007: Spoke with Dr. Larisa Valles, updated on low BP noted throughout dayshift, order for 500 ml normal saline bolus. 2204: Labs drawn by this RN, patient c/o \"being stuck\", informed patient she is hospitalized for electrolyte imbalance. Patient requesting \"three meals\", informed patient she is on a diabetic diet, BG has been elevated, patient continuing to request. Patient assisted to bed, positioned supine. Patient requesting Percocet, however frequently falling asleep during conversation, BP has been low. 2235: Dr. Jossue Maldonado paged, patient asked what she would take at home for the type of pain she is having, states, \"One Tylenol\". 2313: Spoke with Dr. Jossue Maldonado, telephone order with read back for Tylenol. 0002: Tylenol refused. Patient states pain has resolved, returned to sleep.  
0300: Patient states, \"You never brought my pain medication\", reminded patient she refused Tylenol previously, patient then states, \"Just bring it in the morning I don't want it now\". Informed patient to utilize call bell to inform nursing staff if pain increases. 5086: Assisted OOB to recliner, gait steady. Bedside shift change report given to Jason Madison RN (oncoming nurse) by ISIDRA Vu (offgoing nurse). Report included the following information SBAR and Kardex.

## 2018-10-14 NOTE — PROGRESS NOTES
met with Patient completed the initial Spiritual Assessment of the patient, and offered Pastoral Care, see flow sheets for interventions. Patient does not have any Taoist/cultural needs that will affect patients preferences in health care. Charted reviewed. Chaplains will continue to follow and will provide pastoral care on an as needed/requested basis. [id number] [facility] [place] [phone]

## 2018-10-14 NOTE — PROGRESS NOTES
Reason for Admission:   ARF, Hyponatremia RRAT Score:     16 Do you (patient/family) have any concerns for transition/discharge? Recent falls at home Plan for utilizing home health:     Yes. Patient and family are open to this. Likelihood of readmission? Yellow/mod. Some impaired self-care. Transition of Care Plan:      Interviewed patient. Verified demographics listed on face sheet with patient; all information correct. Patient stated their PCP is Dr. Sophie Lagos, seen recently . Patient lives alone though her son often assists and stays with her. Patient's NOK are her 5 children. Patient independent with most ADLs prior to admission. She uses a rolling walker at home. Discharge plan is home with home health. She will need a medicaid cab ride home. Patient has designated her children to participate in his/her discharge plan and to receive any needed information. Name: Shreya Dempsey Address: 
Phone number: [phone] Care Management Interventions PCP Verified by CM: Yes (Dr. Sophie Lagos) Mode of Transport at Discharge: Other (see comment) (Medicaid cab) Transition of Care Consult (CM Consult): Discharge Planning Current Support Network: Lives Alone Confirm Follow Up Transport: Cab Plan discussed with Pt/Family/Caregiver:  Yes

## 2018-10-14 NOTE — ED PROVIDER NOTES
HPI Comments: Jason Devine is a 78year old female who presents to the ED with dizziness and recent falls. Pt states she has felt dizzy for the past two days. She has fallen twice this week while at home. Denies striking her head and has sustained no loss of consciousness. She was at her primary care provider this past Thursday, but nothing was done to correct this problem. Patient is a 78 y.o. female presenting with dizziness. The history is provided by the patient, medical records and the EMS personnel. History limited by: No communication barrier. Dizziness This is a new problem. The current episode started 2 days ago. The problem has not changed since onset. There was no focality noted. Primary symptoms comment: dizziness and recent falls. Past Medical History:  
Diagnosis Date  Diabetes ([address]  Hyperlipidemia  Hypertension Past Surgical History:  
Procedure Laterality Date  HX ORTHOPAEDIC    
 heel spur removal  
 
   
History reviewed. No pertinent family history. Social History Social History  Marital status:  Spouse name: N/A  
 Number of children: N/A  
 Years of education: N/A Occupational History  Not on file. Social History Main Topics  Smoking status: Former Smoker  Smokeless tobacco: Not on file  Alcohol use No  
 Drug use: No  
 Sexual activity: Not on file Other Topics Concern  Not on file Social History Narrative ALLERGIES: Review of patient's allergies indicates no known allergies. Review of Systems Constitutional: Negative. HENT: Negative. Eyes: Negative. Respiratory: Negative. Cardiovascular: Negative. Gastrointestinal: Negative. Endocrine: Negative. Genitourinary: Negative. Musculoskeletal: Negative. Skin: Negative. Allergic/Immunologic: Negative. Neurological: Positive for dizziness. Hematological: Negative. Psychiatric/Behavioral: Negative. Vitals:  
 10/13/18 2104 BP: 150/68 Pulse: 73 Resp: 16 Temp: 98.3 °F (36.8 °C) SpO2: 100% Weight: 86.2 kg (190 lb) Height: 5' 5\" (1.651 m) Physical Exam  
Constitutional: She is oriented to person, place, and time. She appears well-developed and well-nourished. No distress. HENT:  
Head: Normocephalic and atraumatic. Eyes: EOM are normal. Pupils are equal, round, and reactive to light. Neck: Normal range of motion. Neck supple. Cardiovascular: Normal rate, regular rhythm and normal heart sounds. Pulmonary/Chest: Effort normal and breath sounds normal. No respiratory distress. She has no wheezes. She has no rales. Abdominal: Soft. Bowel sounds are normal. There is no tenderness. Genitourinary:  
Genitourinary Comments: NE  
Musculoskeletal: Normal range of motion. Neurological: She is alert and oriented to person, place, and time. No cranial nerve deficit. Coordination normal.  
Skin: Skin is warm and dry. Psychiatric: She has a normal mood and affect. Nursing note and vitals reviewed. MDM Number of Diagnoses or Management Options Chronic renal failure, unspecified CKD stage:  
Dizziness:  
Elevated blood pressure reading: Hypochloremia:  
Hypokalemia:  
Hyponatremia:  
Diagnosis management comments: PROGRESS NOTE:  Reviewed the EMR at [facility]. Pt has known Stage 3 Chronic kidney disease. Alejandrina Templeton NP  12:45 AM 
 
 
 
 
 
ED Course Procedures Diagnosis: 1. Chronic renal failure, unspecified CKD stage 2. Dizziness 3. Hyponatremia 4. Hypokalemia 5. Hypochloremia 6. Elevated blood pressure reading Disposition:   ADMITTED - DR Roslyn Gilbert Follow-up Information None Patient's Medications Start Taking No medications on file Continue Taking ASPIRIN-DIPYRIDAMOLE (AGGRENOX)  MG PER SR CAPSULE    Take 1 Cap by mouth two (2) times a day. GLIPIZIDE (GLUCOTROL) 10 MG TABLET    Take 10 mg by mouth daily. IBUPROFEN (MOTRIN) 800 MG TABLET    Take 1 Tab by mouth every eight (8) hours as needed for Pain (take with food). OXYCODONE-ACETAMINOPHEN (PERCOCET) 5-325 MG PER TABLET    Take 1 tablet every 4-6 hours as needed for pain control. If you were instructed to try over the counter ibuprofen or tylenol, only take the percocet for pain not controlled with the over the counter medication. SIMVASTATIN (ZOCOR) 20 MG TABLET    Take 20 mg by mouth nightly. VALSARTAN (DIOVAN) 80 MG TABLET    Take 80 mg by mouth daily. These Medications have changed No medications on file Stop Taking No medications on file

## 2018-10-15 LAB
ANION GAP SERPL CALC-SCNC: 11 MMOL/L (ref 3–18)
ANION GAP SERPL CALC-SCNC: 11 MMOL/L (ref 3–18)
ANION GAP SERPL CALC-SCNC: 8 MMOL/L (ref 3–18)
BASOPHILS # BLD: 0 K/UL (ref 0–0.1)
BASOPHILS NFR BLD: 0 % (ref 0–2)
BUN SERPL-MCNC: 100 MG/DL (ref 7–18)
BUN SERPL-MCNC: 104 MG/DL (ref 7–18)
BUN SERPL-MCNC: 99 MG/DL (ref 7–18)
BUN/CREAT SERPL: 34 (ref 12–20)
BUN/CREAT SERPL: 35 (ref 12–20)
BUN/CREAT SERPL: 36 (ref 12–20)
CALCIUM SERPL-MCNC: 8.4 MG/DL (ref 8.5–10.1)
CALCIUM SERPL-MCNC: 8.6 MG/DL (ref 8.5–10.1)
CALCIUM SERPL-MCNC: 8.8 MG/DL (ref 8.5–10.1)
CHLORIDE SERPL-SCNC: 90 MMOL/L (ref 100–108)
CHLORIDE SERPL-SCNC: 90 MMOL/L (ref 100–108)
CHLORIDE SERPL-SCNC: 95 MMOL/L (ref 100–108)
CO2 SERPL-SCNC: 30 MMOL/L (ref 21–32)
CO2 SERPL-SCNC: 30 MMOL/L (ref 21–32)
CO2 SERPL-SCNC: 32 MMOL/L (ref 21–32)
CREAT SERPL-MCNC: 2.86 MG/DL (ref 0.6–1.3)
CREAT SERPL-MCNC: 2.88 MG/DL (ref 0.6–1.3)
CREAT SERPL-MCNC: 2.91 MG/DL (ref 0.6–1.3)
DIFFERENTIAL METHOD BLD: ABNORMAL
EOSINOPHIL # BLD: 0 K/UL (ref 0–0.4)
EOSINOPHIL NFR BLD: 1 % (ref 0–5)
ERYTHROCYTE [DISTWIDTH] IN BLOOD BY AUTOMATED COUNT: 15.1 % (ref 11.6–14.5)
GLUCOSE BLD STRIP.AUTO-MCNC: 112 MG/DL (ref 70–110)
GLUCOSE BLD STRIP.AUTO-MCNC: 127 MG/DL (ref 70–110)
GLUCOSE BLD STRIP.AUTO-MCNC: 212 MG/DL (ref 70–110)
GLUCOSE BLD STRIP.AUTO-MCNC: 229 MG/DL (ref 70–110)
GLUCOSE SERPL-MCNC: 115 MG/DL (ref 74–99)
GLUCOSE SERPL-MCNC: 121 MG/DL (ref 74–99)
GLUCOSE SERPL-MCNC: 248 MG/DL (ref 74–99)
HCT VFR BLD AUTO: 34.5 % (ref 35–45)
HGB BLD-MCNC: 11.5 G/DL (ref 12–16)
LYMPHOCYTES # BLD: 1.5 K/UL (ref 0.9–3.6)
LYMPHOCYTES NFR BLD: 37 % (ref 21–52)
MCH RBC QN AUTO: 27.6 PG (ref 24–34)
MCHC RBC AUTO-ENTMCNC: 33.3 G/DL (ref 31–37)
MCV RBC AUTO: 82.7 FL (ref 74–97)
MONOCYTES # BLD: 0.3 K/UL (ref 0.05–1.2)
MONOCYTES NFR BLD: 8 % (ref 3–10)
NEUTS SEG # BLD: 2.2 K/UL (ref 1.8–8)
NEUTS SEG NFR BLD: 54 % (ref 40–73)
PLATELET # BLD AUTO: 152 K/UL (ref 135–420)
PMV BLD AUTO: 11.2 FL (ref 9.2–11.8)
POTASSIUM SERPL-SCNC: 3.7 MMOL/L (ref 3.5–5.5)
POTASSIUM SERPL-SCNC: 4 MMOL/L (ref 3.5–5.5)
POTASSIUM SERPL-SCNC: 4 MMOL/L (ref 3.5–5.5)
RBC # BLD AUTO: 4.17 M/UL (ref 4.2–5.3)
SODIUM SERPL-SCNC: 131 MMOL/L (ref 136–145)
SODIUM SERPL-SCNC: 131 MMOL/L (ref 136–145)
SODIUM SERPL-SCNC: 135 MMOL/L (ref 136–145)
WBC # BLD AUTO: 4.1 K/UL (ref 4.6–13.2)

## 2018-10-15 PROCEDURE — 82962 GLUCOSE BLOOD TEST: CPT

## 2018-10-15 PROCEDURE — 97116 GAIT TRAINING THERAPY: CPT

## 2018-10-15 PROCEDURE — 74011250636 HC RX REV CODE- 250/636: Performed by: HOSPITALIST

## 2018-10-15 PROCEDURE — 65270000029 HC RM PRIVATE

## 2018-10-15 PROCEDURE — 74011636637 HC RX REV CODE- 636/637: Performed by: HOSPITALIST

## 2018-10-15 PROCEDURE — 80048 BASIC METABOLIC PNL TOTAL CA: CPT | Performed by: HOSPITALIST

## 2018-10-15 PROCEDURE — 85025 COMPLETE CBC W/AUTO DIFF WBC: CPT | Performed by: HOSPITALIST

## 2018-10-15 PROCEDURE — 74011250637 HC RX REV CODE- 250/637: Performed by: HOSPITALIST

## 2018-10-15 PROCEDURE — 36415 COLL VENOUS BLD VENIPUNCTURE: CPT | Performed by: HOSPITALIST

## 2018-10-15 PROCEDURE — 97162 PT EVAL MOD COMPLEX 30 MIN: CPT

## 2018-10-15 RX ADMIN — ASPIRIN AND DIPYRIDAMOLE 1 CAPSULE: 25; 200 CAPSULE, EXTENDED RELEASE ORAL at 09:51

## 2018-10-15 RX ADMIN — SODIUM CHLORIDE 100 ML/HR: 900 INJECTION, SOLUTION INTRAVENOUS at 22:38

## 2018-10-15 RX ADMIN — ASPIRIN AND DIPYRIDAMOLE 1 CAPSULE: 25; 200 CAPSULE, EXTENDED RELEASE ORAL at 18:39

## 2018-10-15 RX ADMIN — INSULIN LISPRO 6 UNITS: 100 INJECTION, SOLUTION INTRAVENOUS; SUBCUTANEOUS at 17:30

## 2018-10-15 RX ADMIN — ACETAMINOPHEN 650 MG: 325 TABLET, FILM COATED ORAL at 12:45

## 2018-10-15 RX ADMIN — SIMVASTATIN 20 MG: 20 TABLET, FILM COATED ORAL at 09:51

## 2018-10-15 RX ADMIN — SODIUM CHLORIDE 100 ML/HR: 900 INJECTION, SOLUTION INTRAVENOUS at 05:05

## 2018-10-15 RX ADMIN — INSULIN LISPRO 6 UNITS: 100 INJECTION, SOLUTION INTRAVENOUS; SUBCUTANEOUS at 12:30

## 2018-10-15 RX ADMIN — ACETAMINOPHEN 650 MG: 325 TABLET, FILM COATED ORAL at 22:38

## 2018-10-15 RX ADMIN — ACETAMINOPHEN 650 MG: 325 TABLET, FILM COATED ORAL at 04:45

## 2018-10-15 NOTE — PROGRESS NOTES
Bedside shift change report given to Rafa Dejesus RN (oncoming nurse) by Aniyah Patel RN  (offgoing nurse). Report included the following information SBAR. Bedside shift change report given to ISIDRA Orellana (oncoming nurse) by Pearl Boyd RN (offgoing nurse). Report included the following information SBAR.

## 2018-10-15 NOTE — PROGRESS NOTES
Problem: Pressure Injury - Risk of 
Goal: *Prevention of pressure injury Document Anton Scale and appropriate interventions in the flowsheet. Outcome: Progressing Towards Goal 
Pressure Injury Interventions: 
Sensory Interventions: Keep linens dry and wrinkle-free, Maintain/enhance activity level, Minimize linen layers Moisture Interventions: Internal/External urinary devices, Absorbent underpads, Minimize layers, Moisture barrier, Limit adult briefs Activity Interventions: Increase time out of bed, Pressure redistribution bed/mattress(bed type) Mobility Interventions: HOB 30 degrees or less, Pressure redistribution bed/mattress (bed type) Nutrition Interventions: Document food/fluid/supplement intake, Offer support with meals,snacks and hydration Problem: Falls - Risk of 
Goal: *Absence of Falls Document Junie Garduno Fall Risk and appropriate interventions in the flowsheet. Outcome: Progressing Towards Goal 
Fall Risk Interventions: 
Mobility Interventions: Bed/chair exit alarm, Communicate number of staff needed for ambulation/transfer, Patient to call before getting OOB, Strengthening exercises (ROM-active/passive), Utilize walker, cane, or other assistive device Medication Interventions: Evaluate medications/consider consulting pharmacy, Patient to call before getting OOB, Teach patient to arise slowly Elimination Interventions: Call light in reach, Patient to call for help with toileting needs, Toileting schedule/hourly rounds, Bed/chair exit alarm History of Falls Interventions: Bed/chair exit alarm, Evaluate medications/consider consulting pharmacy, Investigate reason for fall, Room close to nurse's station

## 2018-10-15 NOTE — PROGRESS NOTES
NUTRITION Patient/Family Education Record FACTORS THAT MAY INFLUENCE PATIENTS ABILITY TO LEARN: 
 []   Language barrier    []   Cultural needs   []   Motivation  
 []   Cognitive limitation    []   Physical   []   Education  
[x]   Physiological factors   []   Hearing/vision/speaking impairment   []   Synagogue beliefs []   Financial limitations    []  Other:   []   No barriers limiting ability to learn Person Instructed: 
 [x]   Patient   []   Family   []  Other Preference for Learning: 
 [x]   Verbal   [x]   Written   []  Demonstration Patient educated on:  
[] Cardiac/heart healthy diet 
[] 2gm Sodium diet 
[] Vitamin K regulated diet (coumadin) [] Weight loss/portion control 
[] High protein 
[x] Other: Diabetic diet Goal: 
Patient will demonstrate understanding of modified diet by 10/20 Outcome:  
[]  Patient verbalized understanding of education and willing to comply with recommendations. []  Patient declined education 
[x]  Patient needs follow up education; scheduled date for follow up: 10/17. (Patient extremely lethargic and closed eyes after a few words spoken then would awaken briefly and fall back asleep. Left diabetic diet education at bedside.) [x]  Written information provided 
[x]  RD contact information provided Oleks[address]

## 2018-10-15 NOTE — PROGRESS NOTES
0050:  Assisted patient to [facility] with assistance from Bharat Can RN. Patient voided. Patient returned to bed. SCDs reconnected. Call bell within reach. 0423:  Assisted patient to [facility]. Patient tolerated well. Patient stood with this RN. Patient wiped herself after voiding. Patient returned to bed with one assist. 
 
1840:  Patient provided with requested wash clothes, towel, basin of warm water to wash her face, toothpaste, tooth brush, mouth wash and small grey basin. Patient instructed to call when finished with grooming so things may be taken away.

## 2018-10-15 NOTE — PROGRESS NOTES
Progress Note Patient: Abelino Bhatt               Sex: female          DOA: 10/13/2018 YOB: 1950      Age:  78 y.o.        LOS:  LOS: 1 day CHIEF COMPLAINT:  Hyponatremia and hypokalemia Subjective:  
 
Patient sitting up in chair Smiling Objective:  
  
Visit Vitals  /82  Pulse 70  Temp 97.8 °F (36.6 °C)  Resp 18  Ht 5' 5\" (1.651 m)  Wt 86.2 kg (190 lb)  SpO2 97%  BMI 31.62 kg/m2 Physical Exam: 
Gen:  No distress, no complaint Lungs:  Clear bilaterally, no wheeze or rhonchi Heart:  Regular rate and rhythm, no murmurs or gallops Abdomen:  Soft, non-tender, normal bowel sounds Lab/Data Reviewed: 
 
Labs reviewed Assessment/Plan Principal Problem: Hyponatremia (10/14/2018) Active Problems: 
  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 
Follow sodium and potassium PT eval and treat BS control DVT prophylaxis.

## 2018-10-15 NOTE — PROGRESS NOTES
0725 Received pt in bedside recliner, denies any pain at this time. Skin is intact. IV site no sign of infiltration. Skin is intact. BLE edema noted. Voiding to commode. 0930 PT to room, tolerated well per PT, 1 assist. 
 
1100 Pt in recliner, pain under control. 1500 Assisting pt to bedside commode, uses walker, tolerated well. 
 
1750 Pt very demanding, requesting for total of 4 cans of sodas, furniture in the room arranged in specific way. Pt says she's supposed to get the soda regardless how many because she's paying for it. [address] pt back to bed. Pt demanding, requesting for 2 tooth brush, 4 blankets, 2 towels and 2 wash cloth saying she need the extras for her daughter. BLE with edema, palpable pulse, no numbness, no tingling. Skin is intact. Voiding to commode. IV site no sign of infiltration Pt requests Tylenol for her pain and says its working well on her.

## 2018-10-15 NOTE — PROGRESS NOTES
Problem: Mobility Impaired (Adult and Pediatric) Goal: *Acute Goals and Plan of Care (Insert Text) Physical Therapy Goals Initiated 10/15/2018 and to be accomplished within 7 days. 1.  Patient will complete all bed mobility with modified independence in order to prepare for EOB/OOB activity. 2.  Patient will perform sit <> stand with modified independence in order to prepare for OOB/gait activity. 3.  Patient will perform bed to chair transfers with modified independence in order to promote mobility and encourage seated activity to progress towards their prior level of function. 4.  Patient will ambulate 200 feet with modified independence using LRAD in order to prepare for safe negotiation of their environment. 5.  Patient will ascend/descend 4 steps with S handrail use with modified independence in order to prepare for safe exit/entry into their home environment. Outcome: Progressing Towards Goal 
PHYSICAL THERAPY: Initial Assessment INPATIENT: Medicare: Hospital Day: 3 Patient: John Fry (89 y.o. female)    Date: 10/15/2018 Primary Diagnosis: Acute renal failure (ARF) (HCC) Hyponatremia  
 ,    
Precautions:  (None) PLOF: Independent, ambulation with RW 
 
ASSESSMENT : 
Patient sitting up in chair, agreeable to participation with PT. Reports that she lives alone in a first floor apartment with 4 AXEL. Her son is in the area and able to assist patient as needed; ambulating with RW/cane PTA. Reports B LE pain and edema that both persisted PTA. C/o 10/10 pain with B LE strength assessment; gross strength 4-/5 and may be affected by pain. Notable discoloration/darkening of B LEs below knee. Good seated balance. SBA for sit <> stand. Good standing balance with RW. Supervision for ambulation x 15 ft with RW; ambulates with short strides and decreased gait speed. Overall gait is steady with RW.  Patient reports pain minimal with stance in ambulation. Returned to recliner, legs elevated, and all needs within reach. Discussed rehab vs home health at d/c; patient would prefer home health. Recommend d/c home with home health pending progression with PT. Patient presents with deficits in: 
Bed Mobility, Transfers, Gait and Strength Patient will benefit from skilled intervention to address the above impairments. Patients rehabilitation potential is considered to be Good Factors which may influence rehabilitation potential include:  
[]         None noted 
[]         Mental ability/status [x]         Medical condition 
[]         Home/family situation and support systems 
[]         Safety awareness 
[]         Pain tolerance/management 
[]         Other: PLAN : 
Recommendations and Planned Interventions: 
[x]           Bed Mobility Training             [x]    Neuromuscular Re-Education 
[x]           Transfer Training                   []    Orthotic/Prosthetic Training 
[x]           Gait Training                          []    Modalities [x]           Therapeutic Exercises          []    Edema Management/Control 
[x]           Therapeutic Activities            [x]    Patient and Family Training/Education 
[]           Other (comment): EDUCATION:  
Education:  Patient was educated on the following topics: purpose of PT, PT POC, safety with mobility. Verbalizes understanding. Barriers to Learning/Limitations: None Compensate with: visual, verbal, tactile, kinesthetic cues/model Recommendations for the next treatment session: Gait Frequency/Duration: Patient will be followed by physical therapy 3 - 5 times a week to address goals. Discharge Recommendations: Home Health Further Equipment Recommendations for Discharge: rolling walker Factors which may impact discharge planning: N/A  
 
SUBJECTIVE:  
Patient stated I'm cold .  OBJECTIVE DATA SUMMARY:  
 
Past Medical History:  
Diagnosis Date  Diabetes ([facility] [address]  Hyperlipidemia  Hypertension Past Surgical History:  
Procedure Laterality Date  HX ORTHOPAEDIC    
 heel spur removal  
 
 
Eval Complexity: History: LOW Complexity : Zero comorbidities / personal factors that will impact the outcome / POCExam:MEDIUM Complexity : 3 Standardized tests and measures addressing body structure, function, activity limitation and / or participation in recreation  Presentation: MEDIUM Complexity : Evolving with changing characteristics  Clinical Decision Making:Medium Complexity SBA for mobility, increased LE pain Overall Complexity:MEDIUM 
 
G CODES:Mobility  Current  CI= 1-19%   Goal  CI= 1-19%. The severity rating is based on the Other Clinical judgement: Good balance, 4-/5 LE strength, SBA/Supervision for mobiltity Prior Level of Function/Home Situation:  
Home Situation Home Environment: Apartment # Steps to Enter: 4 Rails to Enter: Yes One/Two Story Residence: One story Living Alone: Yes Support Systems: Child(ronda) Patient Expects to be Discharged to[de-identified] Private residence Current DME Used/Available at Home: Beverly Ganser, rollingCriashley Behavior: 
Neurologic State: Alert Orientation Level: Oriented X4 Cognition: Follows commands Psychosocial 
Patient Behaviors: Calm; Cooperative Purposeful Interaction: Yes 
Manual Muscle Testing (LE) 
B LE pain with testing rated 10/10 R     L Hip Flexion:   4-/5  4-/5 Knee EXT:   4-/5  4-/5 Knee FLEX:   4-/5  4-/5 Ankle DF:   4-/5  4-/5 
_________________________________________________ Tone : normalSensation: NT 
Range Of Motion: [facility] [address] Functional Mobility: 
 
 
Functional Status Indep (I) Mod I Super-vision Min A Mod A Max A Total A Assist x2 Verbal cues Additional time Not tested Comments Rolling []  []  [] []    []    []  []  [] [] [] [x] Supine to sit []  []  [] []  []  []  []  [] [] [] [x] Sit to supine []  []  [] []  []  []  []  [] [] [] [x] Sit to stand []  []  [x] []  []  []  []  [] [] [] [] SBA Stand to sit []  []  [x] []  []  []  []  [] [] [] [] Bed to chair transfers []  []  [] []  []  []  []  [] [] [] [x] Balance Good [address] Poor Unable Not tested Comments Sitting static [x]  []  []  []  [] Sitting dynamic [x]  []  []  []  []   
Standing static [x]  []  []  []  [] Good with RW  
Standing dynamic []  [x]  []  []  [] Mobility/Gait:  
Level of Assistance: Supervision Assistive Device: rolling walker Distance Ambulated: 15 feet Left Lower Extremity: FWB Right Lower Extremity: FWB Base of Support: center of gravity altered Speed/Mariajose: pace decreased (<100 feet/min) and slow Step Length: left shortened and right shortened Swing Pattern: [facility] Stance: [place] Gait Abnormalities: altered arm swing and step to gait Pain: Pain with gross strength assessment; no pain at rest.  
Vital Signs Temp: 98.1 °F (36.7 °C) Pulse (Heart Rate): 69    
BP: 114/67 Resp Rate: 18    
O2 Sat (%): 98 % Activity Tolerance:  
Fair Please refer to the flowsheet for vital signs taken during this treatment. After treatment:  
[x]         Patient left in no apparent distress sitting up in chair 
[]         Patient left in no apparent distress in bed 
[x]         Call bell left within reach [x]         Nursing notified 
[]         Caregiver present 
[]         Bed alarm activated COMMUNICATION/EDUCATION:  
[x]         Fall prevention education was provided and the patient/caregiver indicated understanding. [x]         Patient/family have participated as able in goal setting and plan of care. [x]         Patient/family agree to work toward stated goals and plan of care. []         Patient understands intent and goals of therapy, but is neutral about his/her participation. []         Patient is unable to participate in goal setting and plan of care. Recommendations for nursing: Verbally communicated to: up with assist x 1 Thank you for this referral. 
Patsy Valenzuela Time Calculation: 18 mins

## 2018-10-16 ENCOUNTER — HOME HEALTH ADMISSION (OUTPATIENT)
Dept: HOME HEALTH SERVICES | Facility: HOME HEALTH | Age: 68
End: 2018-10-16

## 2018-10-16 VITALS
WEIGHT: 190 LBS | OXYGEN SATURATION: 96 % | DIASTOLIC BLOOD PRESSURE: 74 MMHG | HEIGHT: 65 IN | RESPIRATION RATE: 16 BRPM | BODY MASS INDEX: 31.65 KG/M2 | TEMPERATURE: 97.7 F | SYSTOLIC BLOOD PRESSURE: 147 MMHG | HEART RATE: 66 BPM

## 2018-10-16 LAB
ANION GAP SERPL CALC-SCNC: 11 MMOL/L (ref 3–18)
ANION GAP SERPL CALC-SCNC: 9 MMOL/L (ref 3–18)
BASOPHILS # BLD: 0 K/UL (ref 0–0.1)
BASOPHILS NFR BLD: 0 % (ref 0–2)
BUN SERPL-MCNC: 85 MG/DL (ref 7–18)
BUN SERPL-MCNC: 92 MG/DL (ref 7–18)
BUN/CREAT SERPL: 35 (ref 12–20)
BUN/CREAT SERPL: 37 (ref 12–20)
CALCIUM SERPL-MCNC: 8.6 MG/DL (ref 8.5–10.1)
CALCIUM SERPL-MCNC: 8.7 MG/DL (ref 8.5–10.1)
CHLORIDE SERPL-SCNC: 96 MMOL/L (ref 100–108)
CHLORIDE SERPL-SCNC: 98 MMOL/L (ref 100–108)
CO2 SERPL-SCNC: 29 MMOL/L (ref 21–32)
CO2 SERPL-SCNC: 30 MMOL/L (ref 21–32)
CREAT SERPL-MCNC: 2.4 MG/DL (ref 0.6–1.3)
CREAT SERPL-MCNC: 2.52 MG/DL (ref 0.6–1.3)
DIFFERENTIAL METHOD BLD: ABNORMAL
EOSINOPHIL # BLD: 0 K/UL (ref 0–0.4)
EOSINOPHIL NFR BLD: 1 % (ref 0–5)
ERYTHROCYTE [DISTWIDTH] IN BLOOD BY AUTOMATED COUNT: 15.3 % (ref 11.6–14.5)
EST. AVERAGE GLUCOSE BLD GHB EST-MCNC: 209 MG/DL
GLUCOSE BLD STRIP.AUTO-MCNC: 179 MG/DL (ref 70–110)
GLUCOSE BLD STRIP.AUTO-MCNC: 199 MG/DL (ref 70–110)
GLUCOSE SERPL-MCNC: 186 MG/DL (ref 74–99)
GLUCOSE SERPL-MCNC: 223 MG/DL (ref 74–99)
HBA1C MFR BLD: 8.9 % (ref 4.2–5.6)
HCT VFR BLD AUTO: 31.8 % (ref 35–45)
HGB BLD-MCNC: 10.3 G/DL (ref 12–16)
LYMPHOCYTES # BLD: 1.3 K/UL (ref 0.9–3.6)
LYMPHOCYTES NFR BLD: 32 % (ref 21–52)
MCH RBC QN AUTO: 27.2 PG (ref 24–34)
MCHC RBC AUTO-ENTMCNC: 32.4 G/DL (ref 31–37)
MCV RBC AUTO: 84.1 FL (ref 74–97)
MONOCYTES # BLD: 0.3 K/UL (ref 0.05–1.2)
MONOCYTES NFR BLD: 6 % (ref 3–10)
NEUTS SEG # BLD: 2.5 K/UL (ref 1.8–8)
NEUTS SEG NFR BLD: 61 % (ref 40–73)
PLATELET # BLD AUTO: 123 K/UL (ref 135–420)
PMV BLD AUTO: 11.1 FL (ref 9.2–11.8)
POTASSIUM SERPL-SCNC: 3.4 MMOL/L (ref 3.5–5.5)
POTASSIUM SERPL-SCNC: 4.4 MMOL/L (ref 3.5–5.5)
RBC # BLD AUTO: 3.78 M/UL (ref 4.2–5.3)
SODIUM SERPL-SCNC: 136 MMOL/L (ref 136–145)
SODIUM SERPL-SCNC: 137 MMOL/L (ref 136–145)
WBC # BLD AUTO: 4.1 K/UL (ref 4.6–13.2)

## 2018-10-16 PROCEDURE — 97116 GAIT TRAINING THERAPY: CPT

## 2018-10-16 PROCEDURE — 82962 GLUCOSE BLOOD TEST: CPT

## 2018-10-16 PROCEDURE — 74011250637 HC RX REV CODE- 250/637: Performed by: HOSPITALIST

## 2018-10-16 PROCEDURE — 97110 THERAPEUTIC EXERCISES: CPT

## 2018-10-16 PROCEDURE — 74011636637 HC RX REV CODE- 636/637: Performed by: HOSPITALIST

## 2018-10-16 PROCEDURE — 83036 HEMOGLOBIN GLYCOSYLATED A1C: CPT | Performed by: HOSPITALIST

## 2018-10-16 PROCEDURE — 74011250636 HC RX REV CODE- 250/636: Performed by: HOSPITALIST

## 2018-10-16 PROCEDURE — 80048 BASIC METABOLIC PNL TOTAL CA: CPT | Performed by: HOSPITALIST

## 2018-10-16 PROCEDURE — 85025 COMPLETE CBC W/AUTO DIFF WBC: CPT | Performed by: HOSPITALIST

## 2018-10-16 PROCEDURE — 97530 THERAPEUTIC ACTIVITIES: CPT

## 2018-10-16 PROCEDURE — 36415 COLL VENOUS BLD VENIPUNCTURE: CPT | Performed by: HOSPITALIST

## 2018-10-16 RX ORDER — INSULIN GLARGINE 100 [IU]/ML
10 INJECTION, SOLUTION SUBCUTANEOUS DAILY
Status: DISCONTINUED | OUTPATIENT
Start: 2018-10-16 | End: 2018-10-16 | Stop reason: HOSPADM

## 2018-10-16 RX ADMIN — SIMVASTATIN 20 MG: 20 TABLET, FILM COATED ORAL at 08:32

## 2018-10-16 RX ADMIN — INSULIN LISPRO 3 UNITS: 100 INJECTION, SOLUTION INTRAVENOUS; SUBCUTANEOUS at 06:40

## 2018-10-16 RX ADMIN — SODIUM CHLORIDE 100 ML/HR: 900 INJECTION, SOLUTION INTRAVENOUS at 06:41

## 2018-10-16 RX ADMIN — ACETAMINOPHEN 650 MG: 325 TABLET, FILM COATED ORAL at 06:41

## 2018-10-16 RX ADMIN — INSULIN LISPRO 3 UNITS: 100 INJECTION, SOLUTION INTRAVENOUS; SUBCUTANEOUS at 13:28

## 2018-10-16 RX ADMIN — ASPIRIN AND DIPYRIDAMOLE 1 CAPSULE: 25; 200 CAPSULE, EXTENDED RELEASE ORAL at 08:32

## 2018-10-16 RX ADMIN — INSULIN GLARGINE 10 UNITS: 100 INJECTION, SOLUTION SUBCUTANEOUS at 13:27

## 2018-10-16 NOTE — PROGRESS NOTES
Offered the pt FOC for home care, she chose [facility]. Pt verified the contact information on the face sheet is correct. Referral sent to intake via [facility] and called to intake nurse,  Nestor Squires. Care Management Interventions PCP Verified by CM: Yes (Dr. Iam Rea) Mode of Transport at Discharge: Other (see comment) (Medicaid cab) Transition of Care Consult (CM Consult): Home Health [address]: Yes Physical Therapy Consult: Yes Current Support Network: Lives Alone Confirm Follow Up Transport: Cab Plan discussed with Pt/Family/Caregiver: Yes Freedom of Choice Offered: Yes Discharge Location Discharge Placement: Home with home health

## 2018-10-16 NOTE — PROGRESS NOTES
0720 - received pt in room. AO. Pt resting in bed. Pain rated 0/10. Daughter at bedside. 0830 - assessment completed. Pt is AOx4. VSS. IV fluid infusing. Call bell at bedside. Daughter at bedside. No distress noted. 18 - assisted pt up to [facility] wit use of walker. Pt tolerated well. Daughter at bedside. 1642 - pt esigned d/c documentation. Pt d/c'd from floor via wheelchair, no distress noted.

## 2018-10-16 NOTE — DISCHARGE SUMMARY
2 [facility]  Hospitalist Division    Discharge Summary    Patient: Naman Gimenez MRN: [id number]  CSN: [id number]    YOB: 1950  Age: 78 y.o. Sex: female    DOA: 10/13/2018 LOS:  LOS: 2 days   Discharge Date: 10/16/2018     Admission Diagnoses: Acute renal failure (ARF) ([facility]  Hyponatremia    Discharge Diagnoses:    Problem List as of 10/16/2018  Date Reviewed: 10/14/2018          Codes Class Noted - Resolved    * (Principal)Hyponatremia ICD-10-CM: E87.1  ICD-9-CM: 276.1  10/14/2018 - Present        Acute renal failure (ARF) ([facility] ICD-10-CM: N17.9  ICD-9-CM: 584.9  10/14/2018 - Present        Cardiac murmur ICD-10-CM: R01.1  ICD-9-CM: 785.2  5/17/2015 - Present        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 female who presented to the ER with weakness and falling.  Patient reports that this has been worsening for about 1 week. Farhana Bartlett does not have SOB.  No seizure or syncope.  She sometimes feels the room is spinning.  She has no fever.  No altered mental status.  In the ER she is found to have severe hyponatremia. She reports that she drinks about 4 very large cups of water per day at home. Farhana Bartlett felt she wasn't drinking enough previously. She was for ongoing management. Electrolytes corrected. PT krys recommended home with home health. Patient is appropriate for discharge home with home health services and instructions to follow up as listed below.        Physical Exam:  General appearance: alert, cooperative, no distress, appears stated age  Lungs: clear to auscultation bilaterally  Heart: regular rate and rhythm, S1, S2 normal, no murmur, click, rub or gallop  Abdomen: soft, non tender, non distended.  Bowel sounds normal.   Extremities: extremities normal, atraumatic, no cyanosis or edema  Skin: Skin color, texture, turgor normal. No rashes or lesions  Neurologic: Grossly normal  PSY: Mood and affect normal, appropriately behaved    Significant Diagnostic Studies:   Recent Results (from the past 24 hour(s))   GLUCOSE, POC    Collection Time: 10/15/18  3:42 PM   Result Value Ref Range    Glucose (POC) 212 (H) 70 - 397 mg/dL   METABOLIC PANEL, BASIC    Collection Time: 10/15/18  8:54 PM   Result Value Ref Range    Sodium 135 (L) 136 - 145 mmol/L    Potassium 4.0 3.5 - 5.5 mmol/L    Chloride 95 (L) 100 - 108 mmol/L    CO2 32 21 - 32 mmol/L    Anion gap 8 3.0 - 18 mmol/L    Glucose 115 (H) 74 - 99 mg/dL    BUN 99 (H) 7.0 - 18 MG/DL    Creatinine 2.91 (H) 0.6 - 1.3 MG/DL    BUN/Creatinine ratio 34 (H) 12 - 20      GFR est AA 20 (L) >60 ml/min/1.73m2    GFR est non-AA 16 (L) >60 ml/min/1.73m2    Calcium 8.8 8.5 - 10.1 MG/DL   GLUCOSE, POC    Collection Time: 10/15/18  9:59 PM   Result Value Ref Range    Glucose (POC) 127 (H) 70 - 342 mg/dL   METABOLIC PANEL, BASIC    Collection Time: 10/16/18  4:09 AM   Result Value Ref Range    Sodium 136 136 - 145 mmol/L    Potassium 4.4 3.5 - 5.5 mmol/L    Chloride 96 (L) 100 - 108 mmol/L    CO2 29 21 - 32 mmol/L    Anion gap 11 3.0 - 18 mmol/L    Glucose 186 (H) 74 - 99 mg/dL    BUN 92 (H) 7.0 - 18 MG/DL    Creatinine 2.52 (H) 0.6 - 1.3 MG/DL    BUN/Creatinine ratio 37 (H) 12 - 20      GFR est AA 23 (L) >60 ml/min/1.73m2    GFR est non-AA 19 (L) >60 ml/min/1.73m2    Calcium 8.6 8.5 - 10.1 MG/DL   CBC WITH AUTOMATED DIFF    Collection Time: 10/16/18  4:09 AM   Result Value Ref Range    WBC 4.1 (L) 4.6 - 13.2 K/uL    RBC 3.78 (L) 4.20 - 5.30 M/uL    HGB 10.3 (L) 12.0 - 16.0 g/dL    HCT 31.8 (L) 35.0 - 45.0 %    MCV 84.1 74.0 - 97.0 FL    MCH 27.2 24.0 - 34.0 PG    MCHC 32.4 31.0 - 37.0 g/dL    RDW 15.3 (H) 11.6 - 14.5 %    PLATELET 382 (L) 368 - 420 K/uL    MPV 11.1 9.2 - 11.8 FL    NEUTROPHILS 61 40 - 73 %    LYMPHOCYTES 32 21 - 52 %    MONOCYTES 6 3 - 10 %    EOSINOPHILS 1 0 - 5 %    BASOPHILS 0 0 - 2 %    ABS. NEUTROPHILS 2.5 1.8 - 8.0 K/UL    ABS. LYMPHOCYTES 1.3 0.9 - 3.6 K/UL    ABS. MONOCYTES 0.3 0.05 - 1.2 K/UL    ABS. EOSINOPHILS 0.0 0.0 - 0.4 K/UL    ABS. BASOPHILS 0.0 0.0 - 0.1 K/UL    DF AUTOMATED     HEMOGLOBIN A1C WITH EAG    Collection Time: 10/16/18  4:09 AM   Result Value Ref Range    Hemoglobin A1c 8.9 (H) 4.2 - 5.6 %    Est. average glucose 209 mg/dL   GLUCOSE, POC    Collection Time: 10/16/18  6:26 AM   Result Value Ref Range    Glucose (POC) 179 (H) 70 - 110 mg/dL   GLUCOSE, POC    Collection Time: 10/16/18 12:41 PM   Result Value Ref Range    Glucose (POC) 199 (H) 70 - 761 mg/dL   METABOLIC PANEL, BASIC    Collection Time: 10/16/18 12:42 PM   Result Value Ref Range    Sodium 137 136 - 145 mmol/L    Potassium 3.4 (L) 3.5 - 5.5 mmol/L    Chloride 98 (L) 100 - 108 mmol/L    CO2 30 21 - 32 mmol/L    Anion gap 9 3.0 - 18 mmol/L    Glucose 223 (H) 74 - 99 mg/dL    BUN 85 (H) 7.0 - 18 MG/DL    Creatinine 2.40 (H) 0.6 - 1.3 MG/DL    BUN/Creatinine ratio 35 (H) 12 - 20      GFR est AA 24 (L) >60 ml/min/1.73m2    GFR est non-AA 20 (L) >60 ml/min/1.73m2    Calcium 8.7 8.5 - 10.1 MG/DL         Discharge Medications:     Current Discharge Medication List      CONTINUE these medications which have NOT CHANGED    Details   oxyCODONE-acetaminophen (PERCOCET) 5-325 mg per tablet Take 1 tablet every 4-6 hours as needed for pain control. If you were instructed to try over the counter ibuprofen or tylenol, only take the percocet for pain not controlled with the over the counter medication.   Qty: 20 Tab, Refills: 0      aspirin-dipyridamole (AGGRENOX)  mg per SR capsule Take 1 Cap by mouth two (2) times a day. ibuprofen (MOTRIN) 800 mg tablet Take 1 Tab by mouth every eight (8) hours as needed for Pain (take with food). Qty: 30 Tab, Refills: 0      glipiZIDE (GLUCOTROL) 10 mg tablet Take 10 mg by mouth daily. simvastatin (ZOCOR) 20 mg tablet Take 20 mg by mouth nightly.            STOP taking these medications       valsartan (DIOVAN) 80 mg tablet Comments:   Reason for Stopping:               Activity: Activity as tolerated and PT/OT per Home Health    Diet: Diabetic Diet    Wound Care: None needed    Follow-up: PCP within 3-5 days     Discharge time: > 35 minutes   Thi Lepe NP  10/16/2018, 2:12 PM

## 2018-10-16 NOTE — DISCHARGE INSTRUCTIONS
DISCHARGE SUMMARY from Nurse    PATIENT INSTRUCTIONS:    After general anesthesia or intravenous sedation, for 24 hours or while taking prescription Narcotics:  · Limit your activities  · Do not drive and operate hazardous machinery  · Do not make important personal or business decisions  · Do  not drink alcoholic beverages  · If you have not urinated within 8 hours after discharge, please contact your surgeon on call. Report the following to your surgeon:  · Excessive pain, swelling, redness or odor of or around the surgical area  · Temperature over 100.5  · Nausea and vomiting lasting longer than 4 hours or if unable to take medications  · Any signs of decreased circulation or nerve impairment to extremity: change in color, persistent  numbness, tingling, coldness or increase pain  · Any questions    What to do at Home:  Recommended activity: Activity as tolerated and no driving for today. If you experience any of the symptoms above, please follow up with your primary care physician. *  Please give a list of your current medications to your Primary Care Provider. *  Please update this list whenever your medications are discontinued, doses are      changed, or new medications (including over-the-counter products) are added. *  Please carry medication information at all times in case of emergency situations. These are general instructions for a healthy lifestyle:    No smoking/ No tobacco products/ Avoid exposure to second hand smoke  Surgeon General's Warning:  Quitting smoking now greatly reduces serious risk to your health.     Obesity, smoking, and sedentary lifestyle greatly increases your risk for illness    A healthy diet, regular physical exercise & weight monitoring are important for maintaining a healthy lifestyle    You may be retaining fluid if you have a history of heart failure or if you experience any of the following symptoms:  Weight gain of 3 pounds or more overnight or 5 pounds in a week, increased swelling in our hands or feet or shortness of breath while lying flat in bed. Please call your doctor as soon as you notice any of these symptoms; do not wait until your next office visit. Recognize signs and symptoms of STROKE:    F-face looks uneven    A-arms unable to move or move unevenly    S-speech slurred or non-existent    T-time-call 911 as soon as signs and symptoms begin-DO NOT go       Back to bed or wait to see if you get better-TIME IS BRAIN. Warning Signs of HEART ATTACK     Call 911 if you have these symptoms:   Chest discomfort. Most heart attacks involve discomfort in the center of the chest that lasts more than a few minutes, or that goes away and comes back. It can feel like uncomfortable pressure, squeezing, fullness, or pain.  Discomfort in other areas of the upper body. Symptoms can include pain or discomfort in one or both arms, the back, neck, jaw, or stomach.  Shortness of breath with or without chest discomfort.  Other signs may include breaking out in a cold sweat, nausea, or lightheadedness. Don't wait more than five minutes to call 911 - MINUTES MATTER! Fast action can save your life. Calling 911 is almost always the fastest way to get lifesaving treatment. Emergency Medical Services staff can begin treatment when they arrive -- up to an hour sooner than if someone gets to the hospital by car. The discharge information has been reviewed with the patient. The patient verbalized understanding. Discharge medications reviewed with the patient and appropriate educational materials and side effects teaching were provided. Patient armband removed and shredded. ___________________________________________________________________________________________________________________________________MyChart Activation    Thank you for requesting access to [employer]. Please follow the instructions below to securely access and download your online medical record. [employer] allows you to send messages to your doctor, view your test results, renew your prescriptions, schedule appointments, and more. How Do I Sign Up? 1. In your internet browser, go to [email or web address]  2. Click on the First Time User? Click Here link in the Sign In box. You will be redirect to the New Member Sign Up page. 3. Enter your [employer] Access Code exactly as it appears below. You will not need to use this code after youve completed the sign-up process. If you do not sign up before the expiration date, you must request a new code. [employer] Access Code: [email or web address]  Expires: 2019  8:57 PM (This is the date your [employer] access code will )    4. Enter the last four digits of your Social Security Number (xxxx) and Date of Birth (mm/dd/yyyy) as indicated and click Submit. You will be taken to the next sign-up page. 5. Create a [employer] ID. This will be your [employer] login ID and cannot be changed, so think of one that is secure and easy to remember. 6. Create a [employer] password. You can change your password at any time. 7. Enter your Password Reset Question and Answer. This can be used at a later time if you forget your password. 8. Enter your e-mail address. You will receive e-mail notification when new information is available in [address] 9. Click Sign Up. You can now view and download portions of your medical record. 10. Click the Download Summary menu link to download a portable copy of your medical information. Additional Information    If you have questions, please visit the Frequently Asked Questions section of the [employer] website at [email or web address] Remember, [employer] is NOT to be used for urgent needs. For medical emergencies, dial 911.

## 2018-10-16 NOTE — PROGRESS NOTES
Discharge plan is to return home with home care as indicated. Patient has a walker for home use.  Vamsi Lee RN

## 2018-10-16 NOTE — PROGRESS NOTES
Problem: Mobility Impaired (Adult and Pediatric) Goal: *Acute Goals and Plan of Care (Insert Text) Physical Therapy Goals Initiated 10/15/2018 and to be accomplished within 7 days. 1.  Patient will complete all bed mobility with modified independence in order to prepare for EOB/OOB activity. 2.  Patient will perform sit <> stand with modified independence in order to prepare for OOB/gait activity. 3.  Patient will perform bed to chair transfers with modified independence in order to promote mobility and encourage seated activity to progress towards their prior level of function. 4.  Patient will ambulate 200 feet with modified independence using LRAD in order to prepare for safe negotiation of their environment. 5.  Patient will ascend/descend 4 steps with S handrail use with modified independence in order to prepare for safe exit/entry into their home environment. Outcome: Progressing Towards Goal 
 
PHYSICAL THERAPY: Daily TREATMENT Note INPATIENT: Medicare: Hospital Day: 4 Patient: Alan Keita (43 y.o. female)    Date: 10/16/2018 Primary Diagnosis: Acute renal failure (ARF) (HCC) Hyponatremia  
 ,  ,  
Precautions:  (None) WBAT Chart, physical therapy assessment, plan of care and goals were reviewed. PLOF:Independent ASSESSMENT: 
Pt instructed in gait training with RW X 100 ft with fluctuations in pace and step length, pt with decreased foot clearance on R. Pt instructed in LE exercises for strength and ROM to improve functional mobility. Progression toward goals: 
      Improving appropriately and progressing toward goals PLAN: 
Patient continues to benefit from skilled intervention to address the above impairments. Continue treatment per established plan of care. EDUCATION:  
Education:  Patient was educated on the following topics: safety with RW Barriers to Learning/Limitations: None Compensate with: visual, verbal, tactile, kinesthetic cues/model Discharge Recommendations:  Home Health Further Equipment Recommendations for Discharge:  N/A Factors which may impact discharge planning: none SUBJECTIVE:  
Patient stated I feel better.  OBJECTIVE DATA SUMMARY:  
Critical Behavior: 
Neurologic State: Alert Orientation Level: Oriented X4 Cognition: Follows commands G CODE:Mobility [id number] Current  CJ= 20-39%   Goal  CI= 1-19%. The severity rating is based on the Other [facility] [address] Standing Balance Scale 
0: Pt performs 25% or less of standing activity (Max assist) CN, 100% impaired. 1: Pt supports self with upper extremities but requires therapist assistance. Pt performs 25-50% of effort (Mod assist) CM, 80% to <100% impaired. 1+: Pt supports self with upper extremities but requires therapist assistance. Pt performs >50% effort. (Min assist). CL, 60% to <80% impaired. 2: Pt supports self independently with both upper extremities (walker, crutches, parallel bars). CL, 60% to <80% impaired. 2+: Pt support self independently with 1 upper extremity (cane, crutch, 1 parallel bar). CK, 40% to <60% impaired. 3: Pt stands without upper extremity support for up to 30 seconds. CK, 40% to <60% impaired. 3+: Pt stands without upper extremity support for 30 seconds or greater. CJ, 20% to <40% impaired. 4: Pt independently moves and returns center of gravity 1-2 inches in one plane. CJ, 20% to <40% impaired. 4+: Pt independently moves and returns center of gravity 1-2 inches in multiple planes. CI, 1% to <20% impaired. 5: Pt independently moves and returns center of gravity in all planes greater than 2 inches. CH, 0% impaired. Functional Mobility: 
 
 
Functional Status Indep (I) Mod I Super-vision Min A Mod A Max A Total A Assist x2 Verbal cues Additional time Not tested Comments Rolling []  []  [] []    []    []  []  [] [] [] [x] Supine to sit []  []  [] []  []  []  []  [] [] [] [x] Sit to supine []  []  [] []  []  []  []  [] [] [] [x] Sit to stand []  []  [x] []  []  []  []  [] [x] [] []   
Stand to sit []  []  [x] []  []  []  []  [] [x] [] [] Bed to chair transfers []  []  [x] []  []  []  []  [] [x] [] [] Balance Good Delores Yisel Poor Unable Not tested Comments Sitting static [x]  []  []  []  [] Sitting dynamic [x]  []  []  []  []   
Standing static [x]  []  []  []  []   
Standing dynamic [x]  []  []  []  [] With support Mobility/Gait:  
Level of Assistance: Supervision Assistive Device: car seat vest and rolling walker Distance Ambulated: 100 feet Left Lower Extremity: WBAT Right Lower Extremity: WBAT Base of Support: [employer] Speed/Mariajose: fluctuations Step Length: DEBRAAtomic ReachSoutheastern [facility] Behavioral Health ServicesProtection Plus [facility] Hospital[facility]ekLinks Swing Pattern: nContact SurgicalSoutheastern [facility] Behavioral Health ServicesProtection Plus [facility] Hospital[facility]ek[facility] Stance: nContact SurgicalSout[facility] Behavior[facility] ServicesProtection Plus [facility] Hospital[facility] Gait Abnormalities: decreased step clearance Stairs:  
Level of Assistance: Not assessed at this time Therapeutic Exercises:  
 
 
 
EXERCISE Sets Reps Active Active Assist  
Passive Self ROM Comments Ankle Pumps 1 10  [x] [] [] [] Quad Sets/Glut Sets 1 10 [x] [] [] [] Hamstring Sets   [] [] [] [] Short Arc Quads   [] [] [] [] Heel Slides   [] [] [] [] Straight Leg Raises   [] [] [] [] Hip Abd/Add 1 10 [x] [] [] [] Long Arc Quads 1 10 [x] [] [] [] Seated Marching 1 10 [x] [] [] []   
Standing Marching   [] [] [] []   
   [] [] [] []   
 
 
Vital Signs Temp: 97.7 °F (36.5 °C) Pulse (Heart Rate): 64    
BP: 125/64 Resp Rate: 16    
O2 Sat (%): 96 %Pain:Pre treatment pain level:0 Post treatment pain level:0Pain Scale 1: Numeric (0 - 10) Pain Intensity 1: 0 Pain Location 1: Back Pain Orientation 1: Lower Pain Description 1: Aching Pain Intervention(s) 1: Medication (see MAR) Activity Tolerance:  
Good After treatment:  
Patient left in no apparent distress sitting up in chair Call bell left within reach Nursing notified Caregiver present Estrada Veliz PTA Time Calculation: 25 mins

## 2018-10-16 NOTE — PROGRESS NOTES
Problem: Pressure Injury - Risk of 
Goal: *Prevention of pressure injury Document Anton Scale and appropriate interventions in the flowsheet. Outcome: Progressing Towards Goal 
Pressure Injury Interventions: 
Sensory Interventions: Keep linens dry and wrinkle-free Moisture Interventions: Offer toileting Q_hr Activity Interventions: Increase time out of bed Mobility Interventions: HOB 30 degrees or less Nutrition Interventions: Document food/fluid/supplement intake Problem: Falls - Risk of 
Goal: *Absence of Falls Document [place] Fall Risk and appropriate interventions in the flowsheet. Outcome: Progressing Towards Goal 
Fall Risk Interventions: 
Mobility Interventions: Bed/chair exit alarm Medication Interventions: Patient to call before getting OOB Elimination Interventions: Call light in reach History of Falls Interventions: Bed/chair exit alarm

## 2018-10-18 ENCOUNTER — HOME CARE VISIT (OUTPATIENT)
Dept: HOME HEALTH SERVICES | Facility: HOME HEALTH | Age: 68
End: 2018-10-18

## 2018-10-18 ENCOUNTER — HOME CARE VISIT (OUTPATIENT)
Dept: SCHEDULING | Facility: HOME HEALTH | Age: 68
End: 2018-10-18

## 2018-10-18 VITALS — HEART RATE: 70 BPM | SYSTOLIC BLOOD PRESSURE: 110 MMHG | OXYGEN SATURATION: 98 % | DIASTOLIC BLOOD PRESSURE: 68 MMHG
